# Patient Record
Sex: FEMALE | Race: WHITE
[De-identification: names, ages, dates, MRNs, and addresses within clinical notes are randomized per-mention and may not be internally consistent; named-entity substitution may affect disease eponyms.]

---

## 2020-12-04 ENCOUNTER — HOSPITAL ENCOUNTER (EMERGENCY)
Dept: HOSPITAL 97 - ER | Age: 80
Discharge: HOME | End: 2020-12-04
Payer: COMMERCIAL

## 2020-12-04 DIAGNOSIS — M13.862: Primary | ICD-10-CM

## 2020-12-04 DIAGNOSIS — I10: ICD-10-CM

## 2020-12-04 PROCEDURE — 99283 EMERGENCY DEPT VISIT LOW MDM: CPT

## 2020-12-04 NOTE — EDPHYS
Physician Documentation                                                                           

 [facility]                                                                 

Name: Rosalind Hernandez                                                                            

Age: 80 yrs                                                                                       

Sex: Female                                                                                       

: 1940                                                                                   

MRN: [id number]                                                                                   

Arrival Date: 2020                                                                          

Time: 10:42                                                                                       

Account#: [id number]                                                                            

Bed 13                                                                                            

Private MD:                                                                                       

[facility]ER[facility] Physician Dylon Paige                                                                      

HPI:                                                                                              

                                                                                             

11:28 This 80 yrs old  Female presents to ER via Wheelchair with complaints of Knee  leidy 

      Pain.                                                                                       

11:28 The patient presents with decreased range of motion, pain, that is acute. The           leidy 

      complaints affect the left knee. Context: The problem was sustained at an unknown site,     

      resulted from an unknown cause, the patient can partially bear weight. Onset: The           

      symptoms/episode began/occurred 2 day(s) ago. Modifying factors: The symptoms are           

      alleviated by elevating leg, remaining still, the symptoms are aggravated by movement,      

      bending knee. Associated signs and symptoms: The patient has no apparent associated         

      signs or symptoms. Treatment prior to arrival includes: no previous treatment. The          

      patient has not experienced similar symptoms in the past.                                   

                                                                                                  

Historical:                                                                                       

- Allergies:                                                                                      

11:28 No Known Allergies;                                                                     ll1 

- PMHx:                                                                                           

11:28 Hypertension; Diabetes - IDDM;                                                          ll1 

- PSHx:                                                                                           

11:20 Tonsillectomy; foot surgery;                                                            ll1 

                                                                                                  

- Immunization history:: Flu vaccine is not up to date.                                           

- Social history:: Smoking status: Patient denies any tobacco usage or history of.                

- Family history:: not pertinent.                                                                 

                                                                                                  

                                                                                                  

ROS:                                                                                              

11:28 Constitutional: Negative for fever, chills, and weight loss, Eyes: Negative for injury, leidy 

      pain, redness, and discharge, ENT: Negative for injury, pain, and discharge, Neck:          

      Negative for injury, pain, and swelling, Cardiovascular: Negative for chest pain,           

      palpitations, and edema, Respiratory: Negative for shortness of breath, cough,              

      wheezing, and pleuritic chest pain, Abdomen/GI: Negative for abdominal pain, nausea,        

      vomiting, diarrhea, and constipation, Back: Negative for injury and pain, : Negative      

      for injury, bleeding, discharge, and swelling, Skin: Negative for injury, rash, and         

      discoloration, Neuro: Negative for headache, weakness, numbness, tingling, and seizure,     

      Psych: Negative for depression, anxiety, suicide ideation, homicidal ideation, and          

      hallucinations, Allergy/Immunology: Negative for hives, rash, and allergies, Endocrine:     

      Negative for neck swelling, polydipsia, polyuria, polyphagia, and marked weight             

      changes, Hematologic/Lymphatic: Negative for swollen nodes, abnormal bleeding, and          

      unusual bruising.                                                                           

11:28 MS/extremity: Positive for decreased range of motion, pain, of the left knee.               

                                                                                                  

Exam:                                                                                             

11:28 Constitutional:  This is a well developed, well nourished patient who is awake, alert,  leidy 

      and in no acute distress. Head/Face:  Normocephalic, atraumatic. Eyes:  Pupils equal        

      round and reactive to light, extra-ocular motions intact.  Lids and lashes normal.          

      Conjunctiva and sclera are non-icteric and not injected.  Cornea within normal limits.      

      Periorbital areas with no swelling, redness, or edema. ENT:  Nares patent. No nasal         

      discharge, no septal abnormalities noted.  Tympanic membranes are normal and external       

      auditory canals are clear.  Oropharynx with no redness, swelling, or masses, exudates,      

      or evidence of obstruction, uvula midline.  Mucous membranes moist. Neck:  Trachea          

      midline, no thyromegaly or masses palpated, and no cervical lymphadenopathy.  Supple,       

      full range of motion without nuchal rigidity, or vertebral point tenderness.  No            

      Meningismus. Chest/axilla:  Normal chest wall appearance and motion.  Nontender with no     

      deformity.  No lesions are appreciated. Cardiovascular:  Regular rate and rhythm with a     

      normal S1 and S2.  No gallops, murmurs, or rubs.  Normal PMI, no JVD.  No pulse             

      deficits. Respiratory:  Lungs have equal breath sounds bilaterally, clear to                

      auscultation and percussion.  No rales, rhonchi or wheezes noted.  No increased work of     

      breathing, no retractions or nasal flaring. Abdomen/GI:  Soft, non-tender, with normal      

      bowel sounds.  No distension or tympany.  No guarding or rebound.  No evidence of           

      tenderness throughout. Back:  No spinal tenderness.  No costovertebral tenderness.          

      Full range of motion. Female :  Normal external genitalia. Skin:  Warm, dry with          

      normal turgor.  Normal color with no rashes, no lesions, and no evidence of cellulitis.     

      Neuro:  Awake and alert, GCS 15, oriented to person, place, time, and situation.            

      Cranial nerves II-XII grossly intact.  Motor strength 5/5 in all extremities.  Sensory      

      grossly intact.  Cerebellar exam normal.  Normal gait. Psych:  Awake, alert, with           

      orientation to person, place and time.  Behavior, mood, and affect are within normal        

      limits.                                                                                     

11:28 Musculoskeletal/extremity: Extremities: noted in the left knee: decreased ROM, pain,        

      ROM: limited active range of motion, limited passive range of motion, limited active        

      range of motion due to pain, Circulation is intact in all extremities. Sensation            

      intact. Joints: the  left knee displays limited range of motion, painful range of           

      motion, tenderness, Weight bearing: can bear weight with assistance only, DVT Exam: no      

      swelling, negative Homans' sign noted on exam, no appreciated bluish discoloration, no      

      erythema, no increased warmth, pain, tenderness.                                            

                                                                                                  

Vital Signs:                                                                                      

11:26  / 97; Pulse 77; Resp 17; Temp 98.4; Pulse Ox 96% ; Pain 10/10;                   ll1 

12:29  / 79; Pulse 67; Resp 17; Pulse Ox 97% ;                                          ll1 

                                                                                                  

MDM:                                                                                              

11:07 Patient medically screened.                                                             [facility] 

11:30 Differential diagnosis: closed fracture, contusion, tendonitis. Data reviewed: vital    [facility] 

      signs, nurses notes, radiologic studies, plain films. Data interpreted: Cardiac             

      monitor: rate is 77 beats/min, rhythm is regular, Pulse oximetry: on room air is 96 %.      

      Test interpretation: by ED physician or midlevel provider: plain radiologic studies.        

      Counseling: I had a detailed discussion with the patient and/or guardian regarding: the     

      historical points, exam findings, and any diagnostic results supporting the                 

      discharge/admit diagnosis, lab results, radiology results, the need for outpatient          

      follow up, for definitive care, an internist, a orthopedic surgeon.                         

                                                                                                  

                                                                                             

11:27 Order name: Knee Left 3 View XRAY                                                       [facility] 

                                                                                             

11:27 Order name: Knee Immobilizer; Complete Time: 12:29                                      [facility] 

12                                                                                             

11:27 Order name: Ice pack; Complete Time: 11:41                                              [facility] 

                                                                                                  

Administered Medications:                                                                         

11:41 Drug: Norco 5 mg-325 mg 2 tabs {Note: rass 0.} Route: PO;                               ll1 

12:45 Follow up: Response: No adverse reaction; Pain is decreased; RASS: Alert and Calm (0)   ll1 

11:41 Drug: Motrin 400 mg Route: PO;                                                          ll1 

12:07 Follow up: Response: No adverse reaction; Pain is decreased; RASS: Alert and Calm (0)   ll1 

12:07 Follow up: Response: No adverse reaction; Pain is decreased; RASS: Alert and Calm (0)   ll1 

                                                                                                  

                                                                                                  

Disposition:                                                                                      

20 12:05 Discharged to Home. 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      

- Condition is Stable.                                                                            

- Discharge Instructions: Joint Pain, Arthritis, How to Use a Knee Brace,                         

  Musculoskeletal Pain, Knee Pain, Arthritis, Easy-to-Read, Cryotherapy.                          

- Prescriptions for Tylenol- Codeine #3 300-30 mg Oral Tablet - take 2 tablets by ORAL            

  route every 6 hours As needed; 24 tablet. Motrin  mg Oral Tablet - take 1                 

  tablet by ORAL route every 6 hours As needed as needed with food; 24 tablet.                    

- Medication Reconciliation Form, Thank You Letter, Antibiotic Education, Prescription            

  Opioid Use form.                                                                                

- Follow up: Private Physician; When: 2 - 3 days; Reason: Recheck today's complaints,             

  Continuance of care, Re-evaluation by your physician. Follow up: Luis Whatley;              

  When: 2 - 3 days; Reason: Recheck today's complaints, Re-evaluation by your physician.          

- Problem is new.                                                                                 

- Symptoms have improved.                                                                         

                                                                                                  

                                                                                                  

                                                                                                  

Signatures:                                                                                       

Dispatcher [employer]                           EDDylon Luong MD MD cha Lewis, Lynsay RN                       RN   ll1                                                  

                                                                                                  

Corrections: (The following items were deleted from the chart)                                    

12:47 12:05 2020 12:05 Discharged to Home. Impression: Pain in left knee -              ll1 

      tricompartment arthritis. Condition is Stable. Discharge Instructions: Joint Pain,          

      Arthritis, How to Use a Knee Brace, Musculoskeletal Pain, Knee Pain, Arthritis,             

      Easy-to-Read, Cryotherapy. Prescriptions for Tylenol-Codeine #3 300-30 mg Oral Tablet -     

      take 2 tablets by ORAL route every 6 hours As needed; 24 tablet, Motrin  mg Oral      

      Tablet - take 1 tablet by ORAL route every 6 hours As needed as needed with food; 24        

      tablet. and Forms are Medication Reconciliation Form, Thank You Letter, Antibiotic          

      Education, Prescription Opioid Use. Follow up: Private Physician; When: 2 - 3 days;         

      Reason: Recheck today's complaints, Continuance of care, Re-evaluation by your              

      physician. Follow up: Luis Whatley; When: 2 - 3 days; Reason: Recheck today's           

      complaints, Re-evaluation by your physician. Problem is new. Symptoms have improved. leidy    

                                                                                                  

**************************************************************************************************

## 2020-12-04 NOTE — ER
Nurse's Notes                                                                                     

 [facility]                                                                 

Name: Rosalind Hernandez                                                                            

Age: 80 yrs                                                                                       

Sex: Female                                                                                       

: 1940                                                                                   

MRN: [id number]                                                                                   

Arrival Date: 2020                                                                          

Time: 10:42                                                                                       

Account#: [id number]                                                                            

Bed 13                                                                                            

Private MD:                                                                                       

Diagnosis: Pain in left knee-tricompartment arthritis                                             

                                                                                                  

Presentation:                                                                                     

                                                                                             

11:26 Chief complaint: Patient states: L knee pain for 1 week. No trauma or falls. No fever.  ll1 

      Coronavirus screen: Client denies travel out of the [place] in the last 14 days. At this       

      time, the client does not indicate any symptoms associated with coronavirus-19. Ebola       

      Screen: Patient denies travel to an Ebola-affected area in the 21 days before illness       

      onset. Initial Sepsis Screen: Does the patient meet any 2 criteria? No. Patient's           

      initial sepsis screen is negative. Does the patient have a suspected source of              

      infection? Yes: Bone or joint infection. Risk Assessment: Do you want to hurt yourself      

      or someone else? Patient reports no desire to harm self or others. Onset of symptoms        

      was 2020.                                                                      

11:26 Method Of Arrival: Wheelchair                                                           ll1 

11:26 Acuity: CESIA 4                                                                           ll1 

                                                                                                  

Historical:                                                                                       

- Allergies:                                                                                      

11:28 No Known Allergies;                                                                     ll1 

- PMHx:                                                                                           

11:28 Hypertension; Diabetes - IDDM;                                                          ll1 

- PSHx:                                                                                           

11:20 Tonsillectomy; foot surgery;                                                            ll1 

                                                                                                  

- Immunization history:: Flu vaccine is not up to date.                                           

- Social history:: Smoking status: Patient denies any tobacco usage or history of.                

- Family history:: not pertinent.                                                                 

                                                                                                  

                                                                                                  

Screenin:20 Abuse screen: Denies threats or abuse. Nutritional screening: No deficits noted.        ll1 

      Tuberculosis screening: No symptoms or risk factors identified. Fall Risk Ambulatory        

      Aid- Crutches/Cane/Walker (15 pts). Gait- Impaired (20 pts.). Total Luna Fall Scale        

      indicates Low Risk Score (25-44 pts). Fall prevention measures have been instituted.        

      Side Rails Up X 2 Frequent Obs/Assesments occuring As available Patient and Family          

      Educated on Fall Prevention Program and strategies.                                         

                                                                                                  

Assessment:                                                                                       

11:26 General: Appears in no apparent distress. Behavior is calm, cooperative, appropriate    ll1 

      for age. Pain: Complains of pain in left knee Quality of pain is described as aching,       

      Aggravated by increased activity. Neuro: No deficits noted. Cardiovascular: No deficits     

      noted. Respiratory: No deficits noted. GI: No deficits noted. Musculoskeletal:              

      Circulation, motion, and sensation intact. Capillary refill < 3 seconds, Range of           

      motion: intact in all extremities, Tenderness present in left knee Reports pain in left     

      knee.                                                                                       

12:30 Reassessment: Patient and/or family updated on plan of care and expected duration. Pain ll1 

      level reassessed. Patient is alert, oriented x 3, equal unlabored respirations, skin        

      warm/dry/pink.                                                                              

                                                                                                  

Vital Signs:                                                                                      

11:26  / 97; Pulse 77; Resp 17; Temp 98.4; Pulse Ox 96% ; Pain 10/10;                   ll1 

12:29  / 79; Pulse 67; Resp 17; Pulse Ox 97% ;                                          ll1 

                                                                                                  

ED Course:                                                                                        

10:42 Patient arrived in ED.                                                                  as  

11:07 Dylon Paige MD is Attending Physician.                                             [facility] 

11:19 Moreno Fernandez RN is Primary Nurse.                                                     ll1 

11:19 Arm band placed on Patient placed in an exam room, on a stretcher.                      ll1 

11:27 Triage completed.                                                                       ll1 

12:05 Luis Whatley MD is Referral Physician.                                            [facility] 

12:05 Knee Left 3 View XRAY In Process Unspecified.                                           EDMS

12:30 Patient has correct armband on for positive identification. Bed in low position. Call   ll1 

      light in reach. Side rails up X 1. Cardiac monitoring not applicable on this patient.       

12:47 No provider procedures requiring assistance completed. Patient did not have IV access   ll1 

      during this emergency room visit.                                                           

                                                                                                  

Administered Medications:                                                                         

11:41 Drug: Norco 5 mg-325 mg 2 tabs {Note: rass 0.} Route: PO;                               ll1 

12:45 Follow up: Response: No adverse reaction; Pain is decreased; RASS: Alert and Calm (0)   ll1 

11:41 Drug: Motrin 400 mg Route: PO;                                                          ll1 

12:07 Follow up: Response: No adverse reaction; Pain is decreased; RASS: Alert and Calm (0)   ll1 

12:07 Follow up: Response: No adverse reaction; Pain is decreased; RASS: Alert and Calm (0)   ll1 

                                                                                                  

                                                                                                  

Outcome:                                                                                          

12:05 Discharge ordered by MD.                                                                leidy 

12:47 Patient left the ED.                                                                    ll1 

12:47 Discharged to home via wheelchair.                                                      ll1 

12:47 Condition: stable                                                                           

12:47 Discharge instructions given to patient, family, Instructed on discharge instructions,      

      follow up and referral plans. medication usage, Demonstrated understanding of               

      instructions, follow-up care, medications, Prescriptions given X 2.                         

                                                                                                  

Signatures:                                                                                       

Dispatcher [employer]                           Dylon Regan MD MD cha Martinez, Amelia as Lewis, Lynsay RN                       RN   ll1                                                  

                                                                                                  

**************************************************************************************************

## 2020-12-04 NOTE — RAD REPORT
EXAM DESCRIPTION:  RAD - Knee Left 3 View - 12/4/2020 12:05 pm

 

CLINICAL HISTORY:  PAIN, nontraumatic

 

COMPARISON:  No comparisons

 

FINDINGS:  No fracture, dislocation or periosteal reaction.No joint effusion seen. No joint space roxy
rowing. Minimal marginal spurring medial compartment and along the intercondylar notch. Spurring is s
een at the quadriceps attachment to the patella and the patella insertion into the tibia.

 

No suspicious soft tissue finding.

 

IMPRESSION:  Mild degenerative change to the knee joint as detailed. No acute bone or joint finding.

 

Clinical concerns for internal derangement or occult bony injury could be further assessed with MR im
aging.

## 2020-12-09 VITALS — TEMPERATURE: 98.4 F

## 2020-12-09 VITALS — OXYGEN SATURATION: 97 % | DIASTOLIC BLOOD PRESSURE: 79 MMHG | SYSTOLIC BLOOD PRESSURE: 160 MMHG

## 2023-05-22 ENCOUNTER — HOSPITAL ENCOUNTER (EMERGENCY)
Dept: HOSPITAL 97 - ER | Age: 83
Discharge: HOME | End: 2023-05-22
Payer: COMMERCIAL

## 2023-05-22 VITALS — OXYGEN SATURATION: 97 % | DIASTOLIC BLOOD PRESSURE: 72 MMHG | SYSTOLIC BLOOD PRESSURE: 155 MMHG

## 2023-05-22 VITALS — TEMPERATURE: 98 F

## 2023-05-22 DIAGNOSIS — E11.9: ICD-10-CM

## 2023-05-22 DIAGNOSIS — I10: ICD-10-CM

## 2023-05-22 DIAGNOSIS — M54.50: Primary | ICD-10-CM

## 2023-05-22 PROCEDURE — 76377 3D RENDER W/INTRP POSTPROCES: CPT

## 2023-05-22 PROCEDURE — 74176 CT ABD & PELVIS W/O CONTRAST: CPT

## 2023-05-22 NOTE — ER
Nurse's Notes                                                                                     

 [facility]                                                                 

Name: Rosalind Hernandez                                                                            

Age: 82 yrs                                                                                       

Sex: Female                                                                                       

: 1940                                                                                   

MRN: [id number]                                                                                   

Arrival Date: 2023                                                                          

Time: 16:32                                                                                       

Account#: [id number]                                                                            

Bed 18                                                                                            

Private MD: Ting Reza                                                                        

Diagnosis: Low back pain                                                                          

                                                                                                  

Presentation:                                                                                     

                                                                                             

16:49 Chief complaint: Patient states: R low back pain, was seen by PCP and had urinalysis w/ ph  

      culture "that was fine" but pt continues to have pain, denies fever, difficulty             

      urinating, N/V/D. Coronavirus screen: Vaccine status: Patient reports being                 

      unvaccinated. Ebola Screen: No symptoms or risks identified at this time. Initial           

      Sepsis Screen: Does the patient meet any 2 criteria? No. Patient's initial sepsis           

      screen is negative. Does the patient have a suspected source of infection? No.              

      Patient's initial sepsis screen is negative. Risk Assessment: Do you want to hurt           

      yourself or someone else? Patient reports no desire to harm self or others. Onset of        

      symptoms was May 22, 2023.                                                                  

16:49 Method Of Arrival: Ambulatory                                                           ph  

16:49 Acuity: CESIA 3                                                                           ph  

                                                                                                  

Historical:                                                                                       

- Allergies:                                                                                      

16:55 No Known Allergies;                                                                     ph  

- PMHx:                                                                                           

16:55 Diabetes - IDDM; Hypertension;                                                          ph  

                                                                                                  

- Immunization history:: Adult Immunizations unknown.                                             

- Social history:: Smoking status: Patient denies any tobacco usage or history of.                

                                                                                                  

                                                                                                  

Screenin:07 [facility] ED Fall Risk Assessment (Adult) History of falling in the last 3 months,       kc6 

      including since admission No falls in past 3 months (0 pts) Confusion or Disorientation     

      No (0 pts) Intoxicated or Sedated No (0 pts) Impaired Gait No (0 pts) Mobility Assist       

      Device Used No (0 pt) Altered Elimination No (0 pt) Score/Fall Risk Level 0 - 2 = Low       

      Risk Oriented to surroundings, Maintained a safe environment, Educated pt \T\ family on     

      fall prevention, incl call for assistance when getting out of bed, Assessed \T\             

      reinforced patient's understanding of fall precautions, Hourly rounding (assess needs \T\   

      fall precautionary measures) done. Abuse screen: Denies threats or abuse. Denies            

      injuries from another. Nutritional screening: No deficits noted. Tuberculosis               

      screening: No symptoms or risk factors identified.                                          

                                                                                                  

Assessment:                                                                                       

17:00 General: Appears in no apparent distress. comfortable, Behavior is calm, cooperative,   kc6 

      appropriate for age. Pain: Complains of pain in right flank. Neuro: [place]                

      Agitation-Sedation Scale (RASS): 0 - Alert and Calm Level of Consciousness is awake,        

      alert, obeys commands, Oriented to person, place, time, situation, Appropriate for age.     

      Cardiovascular: Capillary refill < 3 seconds. Respiratory: Airway is patent Trachea         

      midline Respiratory effort is even, unlabored, Respiratory pattern is regular,              

      symmetrical. GI: No signs and/or symptoms were reported involving the gastrointestinal      

      system. : Urine is blood tinged. EENT: No signs and/or symptoms were reported             

      regarding the EENT system. Derm: No signs and/or symptoms reported regarding the            

      dermatologic system. Skin is intact, Skin is pink, warm \T\ dry. Musculoskeletal: No        

      signs and/or symptoms reported regarding the musculoskeletal system. Circulation,           

      motion, and sensation intact. Capillary refill < 3 seconds, Range of motion: intact in      

      all extremities.                                                                            

17:50 Reassessment: Patient appears in no apparent distress at this time. No changes from     kc6 

      previously documented assessment. Patient and/or family updated on plan of care and         

      expected duration. Pain level reassessed. Patient is alert, oriented x 3, equal             

      unlabored respirations, skin warm/dry/pink.                                                 

                                                                                                  

Vital Signs:                                                                                      

16:49 Pulse 76; Resp 18; Temp 98; Pulse Ox 95% ; Weight 91.63 kg; Height 5 ft. 3 in. ;        ph  

16:49  / 97;                                                                            ph  

17:54  / 80; Pulse 79; Resp 16; Pulse Ox 98% on R/A;                                    kc6 

18:28  / 72; Pulse 65; Resp 19 S; Pulse Ox 97% on R/A;                                  kc6 

16:49 Body Mass Index 35.78 (91.63 kg, 160.02 cm)                                             ph  

                                                                                                  

ED Course:                                                                                        

16:33 Patient arrived in ED.                                                                  am2 

16:33 Ting Reza is Private Physician.                                                    am2 

16:37 Isaiah Handley PA is PHCP.                                                              jmm 

16:37 Tessie Webster MD is Attending Physician.                                                jmm 

16:55 Triage completed.                                                                       ph  

16:56 Arm band placed on.                                                                     ph  

17:03 Mary Anne Morrison, RN is Primary Nurse.                                                 kc6 

17:07 Patient has correct armband on for positive identification. Bed in low position. Call   kc6 

      light in reach. Side rails up X2. Adult w/ patient.                                         

17:42 CT Stone Protocol In Process Unspecified.                                               EDMS

18:36 No provider procedures requiring assistance completed. Patient did not have IV access   kc6 

      during this emergency room visit.                                                           

                                                                                                  

Administered Medications:                                                                         

No medications were administered                                                                  

                                                                                                  

                                                                                                  

Medication:                                                                                       

18:36 VIS not applicable for this client.                                                     kc6 

                                                                                                  

Outcome:                                                                                          

18:14 Discharge ordered by MD.                                                                beny 

18:36 Discharged to home ambulatory, with family, with significant other.                     kc6 

18:36 Condition: stable                                                                           

18:36 Discharge instructions given to patient, Instructed on discharge instructions, follow       

      up and referral plans. medication usage, Demonstrated understanding of instructions,        

      follow-up care, medications, Prescriptions given X 1.                                       

18:36 Patient left the ED.                                                                    kc6 

                                                                                                  

Signatures:                                                                                       

Dispatcher [employer]                           EDMS                                                 

Isaiah Handley PA PA jmm Hall, Patricia, RN                      RN   Zaina Gardner Kaitlyn, RN                   RN   kc6                                                  

                                                                                                  

**************************************************************************************************

## 2023-05-22 NOTE — RAD REPORT
EXAM DESCRIPTION:  CT - Stone Protocol - 5/22/2023 5:40 pm

 

CLINICAL HISTORY:  Flank pain.

right flank pain

 

COMPARISON:  <Comparisons>

 

TECHNIQUE:  Axial images were obtained without oral or IV contrast. Lack of contrast limits solid org
an and vascular assessment. The field-of-view spans the entirety of the  system partially obscuring
 uppermost abdomen and lung bases. Coronal reformatted images were obtained and reviewed.

 

All CT scans are performed using dose optimization technique as appropriate and may include automated
 exposure control or mA/KV adjustment according to patient size.

 

FINDINGS:  The lower lung fields are clear.

 

Imaged portions of the liver and spleen show no suspicious findings on non-contrast imaging. The panc
reas and adrenal glands are normal. No pathologic lymphadenopathy in the abdomen or pelvis.

 

No urinary tract stones or obstructive uropathy.

 

No bowel obstruction, free air, free fluid or abscess. Normal appendix noted.Mild sigmoid diverticulo
sis coli without diverticulitis.

 

Moderate lumbar degenerative changes.

 

IMPRESSION:  No urinary tract stones or obstructive uropathy.

 

Sigmoid diverticulosis coli without diverticulitis.

## 2023-05-22 NOTE — EDPHYS
Physician Documentation                                                                           

 [facility]                                                                 

Name: Rosalind Hernandez                                                                            

Age: 82 yrs                                                                                       

Sex: Female                                                                                       

: 1940                                                                                   

MRN: [id number]                                                                                   

Arrival Date: 2023                                                                          

Time: 16:32                                                                                       

Account#: [id number]                                                                            

Bed 18                                                                                            

Private MD: Ting Reza ED Physician Tessie Webster                                                                         

Historical:                                                                                       

- Allergies:                                                                                      

                                                                                             

16:55 No Known Allergies;                                                                     ph  

- PMHx:                                                                                           

16:55 Diabetes - IDDM; Hypertension;                                                          ph  

                                                                                                  

- Immunization history:: Adult Immunizations unknown.                                             

- Social history:: Smoking status: Patient denies any tobacco usage or history of.                

                                                                                                  

                                                                                                  

Vital Signs:                                                                                      

16:49 Pulse 76; Resp 18; Temp 98; Pulse Ox 95% ; Weight 91.63 kg; Height 5 ft. 3 in. ;        ph  

16:49  / 97;                                                                            ph  

17:54  / 80; Pulse 79; Resp 16; Pulse Ox 98% on R/A;                                    kc6 

18:28  / 72; Pulse 65; Resp 19 S; Pulse Ox 97% on R/A;                                  kc6 

16:49 Body Mass Index 35.78 (91.63 kg, 160.02 cm)                                             ph  

                                                                                                  

MDM:                                                                                              

16:54 Patient medically screened.                                                             [facility] 

                                                                                                  

                                                                                             

16:54 Order name: [facility] Stone Protocol; Complete Time: 17:50                                     [facility] 

                                                                                                  

Administered Medications:                                                                         

No medications were administered                                                                  

                                                                                                  

                                                                                                  

Disposition Summary:                                                                              

23 18:14                                                                                    

Discharge Ordered                                                                                 

      Location: Home                                                                          [facility] 

      Condition: Stable                                                                       [facility] 

      Diagnosis                                                                                   

        - Low back pain                                                                       [facility] 

      Followup:                                                                               [facility] 

        - With: Private Physician                                                                  

        - When: 2 - 3 days                                                                         

        - Reason: Recheck today's complaints, Continuance of care, Re-evaluation by your           

      physician                                                                                   

      Discharge Instructions:                                                                     

        - Discharge Summary Sheet                                                             [facility] 

        - Acute Back Pain, Adult                                                              [facility] 

      Forms:                                                                                      

        - Medication Reconciliation Form                                                      [facility] 

        - Thank You Letter                                                                    [facility] 

        - Antibiotic Education                                                                [facility] 

        - Prescription Opioid Use                                                             [facility] 

      Prescriptions:                                                                              

        - orphenadrine citrate 100 mg Oral Tablet Sustained Release                                

            - take 1 tablet by ORAL route 2 times per day As needed; 20 tablet; Refills: 0,   [facility] 

      Product Selection Permitted                                                                 

Signatures:                                                                                       

Dispatcher [employer]                           EDIsaiah Jacobsen PA PA jmm Hall, Patricia RN                      RN   ph                                                   

                                                                                                  

**************************************************************************************************

## 2023-05-22 NOTE — XMS REPORT
Continuity of Care Document

                             Created on:May 22, 2023



Patient:DAVE DUMONT

Sex:Female

:1940

External Reference #:[id number]





Demographics







                          Address                   [address] [place] [address]

 

                          Home Phone                [phone]

 

                          Mobile Phone              [phone]

 

                          Email Address             [email or web address]

 

                          Preferred Language        English

 

                          Marital Status            Unknown

 

                          Yarsani Affiliation     Unknown

 

                          Race                      Unknown

 

                          Additional Race(s)        Unavailable



                                                    Unavailable



                                                    White

 

                          Ethnic Group              Unknown









Author







                          Organization              [facility]

t

 

                          Address                   [address] [place] [address]

 

                          Phone                     [phone]









Support







                Name            Relationship    Address         Phone

 

                Favorite, Ethan  Child           Unavailable     [phone]

 

                1               ETHAN            Unavailable     Unavailable

 

                2               Unavailable     Unavailable     Unavailable









Care Team Providers







                    Name                Role                Phone

 

                    Juaquin THOMAS, Ting Jones Primary Care Physician [phone]

 

                    ONIEL PARIKH        Attending Clinician Unavailable

 

                    LAB90               Attending Clinician Unavailable

 

                    ANDERS FOSTER      Attending Clinician Unavailable

 

                    TING HICKEY Attending Clinician Unavailable

 

                    MD MAC  Attending Clinician Unavailable

 

                    Fozia THOMAS, Dwayne Carvalho Attending Clinician [phone]

 

                    Larry RN, Elyssa Buchanan Attending Clinician Unavailable

 

                    Ting Hickey MD Attending Clinician [phone]

[phone]

 

                    WATERS_S            Attending Clinician Unavailable

 

                    HUANG_S            Admitting Clinician Unavailable









Payers







           Payer Name Policy Type Policy Number Effective Date Expiration Date S

ource

 

           [employer] [place] 5          [id number] 2022            



                                            00:00:00              

 

           [employer] (MEDICARE            [id number] 2022            



           REPLACEMENT PPO)                       00:00:00              







Problems







       Condition Condition Condition Status Onset  Resolution Last   Treating Co

mments 

Source



       Name   Details Category        Date   Date   Treatment Clinician        



                                                 Date                 

 

       Immunodefi Immunodefi Disease Active 2022                             DAHIANA barclay



       ciency due ciency due                                              Se

ybold



       to     to                   00:00:                             -



       conditions conditions               00                                 Ex

terna



       classified classified                                                  l



       elsewhere elsewhere                                                  

 

       Uncontroll Uncontroll Disease Active                              DAHIANA barclay



       ed type 2 ed type 2               1-17                               Sey[facility]

old



       diabetes diabetes               00:00:                             -



       mellitus mellitus               00                                 Extern

a



       with   with                                                    l



       hyperglyce hyperglyce                                                  



       salomon    salomon                                                     

 

       Primary Primary Disease Active                              Minoo



       hypertensi hypertensi               1-17                               Se

ybold



       on     on                   00:00:                             -



                                   00                                 Externa



                                                                      l

 

       No known No known Disease                                           Kelse

y



       active active                                                  [facility]



       problems problems                                                  







Allergies, Adverse Reactions, Alerts







       Allergy Allergy Status Severity Reaction(s) Onset  Inactive Treating Comm

ents 

Source



       Name   Type                        Date   Date   Clinician        

 

       Sulfa  Propensi Active        Nausea Only                       Matthew

sey



       Drugs  ty to                       8                        [facility]



              adverse                      00:00:                      -



              reaction                      00                          Externa



              s                                                       l

 

       Amoxicil Propensi Active        Other                        Minoo



       jim    ty to                       8                        [facility]



              adverse                      00:00:                      -



              reaction                      00                          Externa



              s                                                       l

 

       Cephalex Propensi Active        Nausea Only                       K

elsey



       in     ty to                       8                        [facility]



              adverse                      00:00:                      -



              reaction                      00                          Externa



              s                                                       l

 

       Penicill Propensi Active        Other                        Minoo



       in V   ty to                       8                        S[facility]



       Potassiu adverse                      00:00:                      



       m      reaction                      00                          



              s                                                       

 

       Sulfa  Propensi Active        Nausea Only                       Matthew

sey



       Drugs  ty to                                               [facility]



              adverse                      00:00:                      



              reaction                      00                          



              s                                                       

 

       Lisinopr Propensi Active        Other                        Minoo



       il     ty to                       830                        [facility]



              adverse                      00:00:                      -



              reaction                      00                          Externa



              s                                                       l

 

       Lisinopr Propensi Active        Other                        Minoo



       il     ty to                       830                        [facility]



              adverse                      00:00:                      



              reaction                      00                          



              s                                                       







Family History







           Family Member Diagnosis  Comments   Start Date Stop Date  Source

 

           Natural father Heart failure                                  [facility]

 

           Natural mother                                             [facility]







Social History







           Social Habit Start Date Stop Date  Quantity   Comments   Source

 

           Gender identity                                             [facility]



                                                                  [facility]

 

           Sexual orientation                                             [facility]

ist



                                                                  [facility]ospital

 

           History of Social 2023                       [facility]



           function   00:00:00   00:00:00                         Hospital

 

           Tobacco use and 2022 Smokeless             [facility]



           exposure   00:00:00   00:00:00   tobacco non-user            Hospital

 

           Sex Assigned At 1940                       [facility]



           Birth      00:00:00   00:00:00                         Hospital









                Smoking Status  Start Date      Stop Date       Source

 

                Never smoked tobacco                                 [facility]

old - External







Medications







       Ordered Filled Start  Stop   Current Ordering Indication Dosage Frequency

 Signature

                    Comments            Components          Source



     Medication Medication Date Date Medication? Clinician                (SIG) 

          



     Name Name                                                   

 

     ASPIRIN 81            Yes            1{tbl}      Take 1           Matthew

sey



     OR        5-16                               tablet by           Sey[facility]



               11:23:                               mouth           -



               09                                 daily           Externa



                                                                 l

 

     Cranberry      -0      Yes                      Take by           Kelse

y



     400 MG oral      5-16                               mouth           [facility]



     Capsule      11:23:                                              -



               09                                                Externa



                                                                 l

 

     vit       -0      Yes                      Take by           [facility]



     C/E/zinc      4-05                               mouth.           st



     ox/francis/lut      14:48:                                              Hospit

a



     /zeax      09                                                l



     (ICAPS                                                        



     AREDS2                                                        



     ORAL)                                                        

 

     cholecalcif      0      Yes            125ug QD   Take 125           M

ethodi



     shadi,      4-05                               mcg by           st



     vitamin D3,      14:48:                               mouth           Hos[facility]

ta



     (VITAMIN D3      09                                 daily.           l



     ORAL)                                                        

 

     aspirin            Yes            81mg      Take 1           [facility]



     (ECOTRIN)      4-05                               tablet (81           st



     81 MG      14:46:                               mg total)           Hospita



     enteric      48                                 by mouth.           l



     coated                                                        



     tablet                                                        

 

     cranberry            Yes                      Take by           Metho

di



     400 mg      4-05                               mouth.           st



     capsule      14:46:                                              Hospita



               48                                                l

 

     rosuvastati      0      Yes            5mg  QD   Take 1           Meth

tyrese



     n (CRESTOR)      4-05                               tablet (5           st



     5 mg tablet      00:00:                               mg total)           H

ospita



               00                                 by mouth           l



                                                  daily.           

 

     Cranberry            Yes                      Take by           Kelse

y



     400 MG oral      2-15                               mouth           [facility]



     Capsule      07:25:                                              -



               37                                                Externa



                                                                 l

 

     Irbesartan-      -0      Yes       [id number] 1{tbl}      Take 1         

  Minoo



     hydroCHLORO      1-03                               tablet by           Sey

bold



     thiazide      00:00:                               mouth           -



     150-12.5 MG      00                                 daily           Externa



     oral Tablet                                                        l

 

     Irbesartan-      -0      Yes       [id number] 1{tbl}      Take 1         

  [facility]ino[facility]



     hydroCHLORO      1-03                               tablet by           Sey

bold



     thiazide      00:00:                               mouth           -



     150-12.5 MG      00                                 daily           Externa



     oral Tablet                                                        l

 

     aspirin      2022      Yes            81mg      Take 1           [facility]



     (ECOTRIN)      2-28                               tablet (81           st



     81 MG      13:04:                               mg total)           [facility]osp[facility]



     enteric      17                                 by mouth.           l



     coated                                                        



     tablet                                                        

 

     cranberry      2022      Yes                      Take by           Metho

di



     400 mg      2-28                               mouth.           st



     capsule      13:04:                                              [facility]osp[facility]



               17                                                l

 

     guaiFENesin      2022      Yes            200mg Q.[id number] Take 10 mL    

       [facility]



     (Tussin)      2-28                          [id number] (200 mg           st



     100 mg/5 mL      13:04:                          3D   total) by           H

ospita



     syrup      17                                 mouth 3           l



                                                  (three)           



                                                  times a           



                                                  day as           



                                                  needed for           



                                                  cough.           

 

     aspirin      2022      Yes            81mg      Take 1           [facility]



     (ECOTRIN)      2-28                               tablet (81           st



     81 MG      13:04:                               mg total)           [facility]osp[facility]



     enteric      17                                 by mouth.           l



     coated                                                        



     tablet                                                        

 

     cranberry      2022      Yes                      Take by           Metho

di



     400 mg      2-28                               mouth.           st



     capsule      13:04:                                              [facility]osp[facility]



               17                                                l

 

     guaiFENesin      2022      Yes            200mg Q.[id number] Take 10 mL    

       [facility]



     (Tussin)      2-28                          [id number] (200 mg           st



     100 mg/5 mL      13:04:                          3D   total) by           H

ospita



     syrup      17                                 mouth 3           l



                                                  (three)           



                                                  times a           



                                                  day as           



                                                  needed for           



                                                  cough.           

 

     aspirin      2022      Yes            81mg      Take 1           [facility]



     (ECOTRIN)      2-28                               tablet (81           st



     81 MG      13:04:                               mg total)           [facility]osp[facility]



     enteric      17                                 by mouth.           l



     coated                                                        



     tablet                                                        

 

     cranberry      2022      Yes                      Take by           Metho

di



     400 mg      2-28                               mouth.           st



     capsule      13:04:                                              [facility]ospita



               17                                                l

 

     guaiFENesin      2022      Yes            200mg Q.[id number] Take 10 mL    

       [facility]



     (Tussin)      2-28                          [id number] (200 mg           st



     100 mg/5 mL      13:04:                          3D   total) by           H

ospita



     syrup      17                                 mouth 3           l



                                                  (three)           



                                                  times a           



                                                  day as           



                                                  needed for           



                                                  cough.           

 

     aspirin      2022      Yes            81mg      Take 1           [facility]



     (ECOTRIN)      2-28                               tablet (81           st



     81 MG      13:04:                               mg total)           [facility]osp[facility]



     enteric      17                                 by mouth.           l



     coated                                                        



     tablet                                                        

 

     cranberry      2022      Yes                      Take by           Metho

di



     400 mg      2-28                               mouth.           st



     capsule      13:04:                                              [facility]ospita



               17                                                l

 

     guaiFENesin      2022      Yes            200mg Q.[id number] Take 10 mL    

       [facility]



     (Tussin)      2-28                          [id number] (200 mg           st



     100 mg/5 mL      13:04:                          3D   total) by           H

ospita



     syrup      17                                 mouth 3           l



                                                  (three)           



                                                  times a           



                                                  day as           



                                                  needed for           



                                                  cough.           

 

     guaiFENesin      2022      Yes            200mg Q.[id number] Take 10 mL    

       [facility]



     (Tussin)      -                          [id number] (200 mg           st



     100 mg/5 mL      13:04:                          3D   total) by           H

ospita



     syrup      17                                 mouth 3           l



                                                  (three)           



                                                  times a           



                                                  day as           



                                                  needed for           



                                                  cough.           

 

     aspirin      2022      Yes            81mg      Take 1           [facility]



     (ECOTRIN)      2-28                               tablet (81           st



     81 MG      13:04:                               mg total)           [facility]ospita



     enteric      17                                 by mouth.           l



     coated                                                        



     tablet                                                        

 

     cranberry      2022      Yes                      Take by           Meth[facility]

di



     400 mg      2-28                               mouth.           st



     capsule      13:04:                                              Hospita



               17                                                l

 

     guaiFENesin      2022      Yes            200mg Q.[id number] Take 10 mL    

       [facility]



     (Tussin)      2-                          [id number] (200 mg           st



     100 mg/5 mL      13:04:                          3D   total) by           H

ospita



     syrup      17                                 mouth 3           l



                                                  (three)           



                                                  times a           



                                                  day as           



                                                  needed for           



                                                  cough.           

 

     Doxycycline      2022 No                       TAKE ONE           K

elsey



     Hyclate 100      -                          CAPSULE           Seyb

old



     MG oral      00:00: 00:00                          DAILY WITH           -



     Capsule      00   :00                           FOOD. WAIT           Extern

a



                                                  2 HOURS           l



                                                  BEFORE           



                                                  LYING DOWN           

 

     Metformin      2022      Yes       [id number] 500mg      Take 1           

Minoo



     HCl 500 MG      1-30                               tablet           [facility]



     oral Tablet      00:00:                               (500 mg           -



               00                                 total) by           Externa



                                                  mouth in           l



                                                  the            



                                                  morning           



                                                  and 1           



                                                  tablet           



                                                  (500 mg           



                                                  total) in           



                                                  the            



                                                  evening.           



                                                  Take with           



                                                  meals.           

 

     Metformin      2022      Yes       [id number] 500mg      Take 1           

Minoo



     HCl 500 MG      1-30                               tablet           [facility]



     oral Tablet      00:00:                               (500 mg           -



               00                                 total) by           Externa



                                                  mouth in           l



                                                  the            



                                                  morning           



                                                  and 1           



                                                  tablet           



                                                  (500 mg           



                                                  total) in           



                                                  the            



                                                  evening.           



                                                  Take with           



                                                  meals.           

 

     metFORMIN      2022      Yes                      TAKE 1           [facility]

[facility]



     (GLUCOPHAGE      1-30                               TABLET           st



     ) 500 mg      00:00:                               (500 MG           [facility]

a



     tablet      00                                 TOTAL) BY           l



                                                  MOUTH IN           



                                                  THE            



                                                  MORNING           



                                                  AND 1           



                                                  TABLET IN           



                                                  THE            



                                                  EVENING           



                                                  WITH MEALS           

 

     metFORMIN      2022      Yes                      TAKE 1           [facility]

[facility]



     (GLUCOPHAGE      1-30                               TABLET           st



     ) 500 mg      00:00:                               (500 MG           [facility]

a



     tablet      00                                 TOTAL) BY           l



                                                  MOUTH IN           



                                                  THE            



                                                  MORNING           



                                                  AND 1           



                                                  TABLET IN           



                                                  THE            



                                                  EVENING           



                                                  WITH MEALS           

 

     metFORMIN      2022      Yes                      TAKE 1           [facility]

[facility]



     (GLUCOPHAGE      1-30                               TABLET           st



     ) 500 mg      00:00:                               (500 MG           [facility]

a



     tablet      00                                 TOTAL) BY           l



                                                  MOUTH IN           



                                                  THE            



                                                  MORNING           



                                                  AND 1           



                                                  TABLET IN           



                                                  THE            



                                                  EVENING           



                                                  WITH MEALS           

 

     metFORMIN      2022      Yes                      TAKE 1           [facility]

[facility]



     (GLUCOPHAGE      1-30                               TABLET           st



     ) 500 mg      00:00:                               (500 MG           [facility]

a



     tablet      00                                 TOTAL) BY           l



                                                  MOUTH IN           



                                                  THE            



                                                  MORNING           



                                                  AND 1           



                                                  TABLET IN           



                                                  THE            



                                                  EVENING           



                                                  WITH MEALS           

 

     metFORMIN      2022      Yes                      TAKE 1           [facility]

[facility]



     (GLUCOPHAGE      1-30                               TABLET           st



     ) 500 mg      00:00:                               (500 MG           [facility]

a



     tablet      00                                 TOTAL) BY           l



                                                  MOUTH IN           



                                                  THE            



                                                  MORNING           



                                                  AND 1           



                                                  TABLET IN           



                                                  THE            



                                                  EVENING           



                                                  WITH MEALS           

 

     metFORMIN      2022      Yes                      TAKE 1           [facility]

[facility]



     (GLUCOPHAGE      1-30                               TABLET           st



     ) 500 mg      00:00:                               (500 MG           [facility]

a



     tablet      00                                 TOTAL) BY           l



                                                  MOUTH IN           



                                                  THE            



                                                  MORNING           



                                                  AND 1           



                                                  TABLET IN           



                                                  THE            



                                                  EVENING           



                                                  WITH MEALS           

 

     ASPIRIN 81      2022      Yes            1{tbl}      Take 1           [facility]

[facility]



     OR        1-29                               tablet by           [facility]



               08:11:                               mouth           -



               49                                 daily           [facility]



                                                                 l

 

     ASPIRIN 81      2022      Yes            1{tbl}      Take 1           [facility]

[facility]



     OR        1-29                               tablet by           [facility]



               08:11:                               mouth           -



               49                                 daily           [facility]



                                                                 l

 

     Tresiba      2022      Yes                      INJECT 68           [facility]

[facility]



     FlexTouch      1-26                               UNITS           st



     U-100 100      00:00:                               SUBCUTANEO           [facility]

[facility]



     unit/mL (3      00                                 USLY DAILY           l



     mL)                                          FOR            



     subcutaneou                                         DIABETES           



     s pen                                                        

 

     Tresiba      2022      Yes                      INJECT 68           [facility]

[facility]



     FlexTouch      1-26                               UNITS           st



     U-100 100      00:00:                               SUBCUTANEO           [facility]

[facility]



     unit/mL (3      00                                 USLY DAILY           l



     mL)                                          FOR            



     subcutaneou                                         DIABETES           



     s pen                                                        

 

     Tresiba      2022      Yes                      INJECT 68           [facility]

[facility]



     FlexTouch      1-26                               UNITS           st



     U-100 100      00:00:                               SUBCUTANEO           [facility]

spita



     unit/mL (3      00                                 USLY DAILY           l



     mL)                                          FOR            



     subcutaneou                                         DIABETES           



     s pen                                                        

 

     Tresiba      2022      Yes                      INJECT 68           [facility]

[facility]



     FlexTouch      1-26                               UNITS           st



     U-100 100      00:00:                               SUBCUTANEO           [facility]

spita



     unit/mL (3      00                                 USLY DAILY           l



     mL)                                          FOR            



     subcutaneou                                         DIABETES           



     s pen                                                        

 

     Tresiba      2022      Yes                      INJECT 68           [facility]

[facility]



     FlexTouch      1-26                               UNITS           st



     U-100 100      00:00:                               SUBCUTANEO           [facility]

spita



     unit/mL (3      00                                 USLY DAILY           l



     mL)                                          FOR            



     subcutaneou                                         DIABETES           



     s pen                                                        

 

     Tresiba      2022      Yes                      INJECT 68           [facility]

[facility]



     FlexTouch      1-26                               UNITS           st



     U-100 100      00:00:                               SUBCUTANEO           [facility]

spita



     unit/mL (3      00                                 USLY DAILY           l



     mL)                                          FOR            



     subcutaneou                                         DIABETES           



     s pen                                                        

 

     Triamcinolo      2022      Yes                      APPLY THIN           

[facility]



     ne        1-11                               LAYER           [facility]



     Acetonide      00:00:                               AFFECTED           -



     0.1 % apply      00                                 AREAS ON           Exte

rna



     externally                                         BUTTOCKS           l



     Cream                                         FOR 2           



                                                  WEEKS/DENAE           



                                                  H              

 

     Triamcinolo      2022- No                       APPLY THIN          

 [facility]



     ne        1-11 02-21                          LAYER           [facility]



     Acetonide      00:00: 00:00                          AFFECTED           -



     0.1 % apply      00   :00                           AREAS ON           Exte

rna



     externally                                         BUTTOCKS           l



     Cream                                         FOR 2           



                                                  WEEKS/DENAE           



                                                  H              

 

     Nitrofurant      2022- No        [id number] 100mg      Take 1         

  [facility]



     oin Monohyd      -29                          capsule           [facility]

old



     Macro 100      00:00: 00:00                          (100 mg           -



     MG oral      00   :00                           total) by           [facility]



     Capsule                                         mouth 2           l



                                                  times           



                                                  daily           

 

     Insulin            Yes       [id number]           INJECT 68           

[facility]



     Degludec      8-17                               UNITS           [facility]



     (Tresiba      00:00:                               SUBCUTANEO           -



     FlexTouch)      00                                 USLY DAILY           Ext

jess[facility]



     100 UNIT/ML                                         FOR            l



     subcutaneou                                         DIABETES           



     s Solution                                                        



     Pen-injecto                                                        



     r                                                           

 

     Insulin Pen            Yes       [id number]           USE 1           

[facility]



     Needle      8-17                               NEEDLE VIA           [facility]



     (Novofine      00:00:                               DEVICE           -



     Pen Needle)      00                                 EVERY           [facility]



     32G X 6 MM                                         MORNING           l



     does not                                         FOR THE           



     apply Misc                                         TRESIBA           



                                                  PEN            

 

     OneTouch            Yes       [id number]           Inject 1           

[facility]



     Delica      8-17                               Lancet           [facility]



     Lancets 33G      00:00:                               into the           -



     does not      00                                 skin 2           [facility]



     apply Misc                                         times           l



                                                  daily           

 

     Insulin            Yes       [id number]           INJECT 68           

[facility]



     Degludec      8-17                               UNITS           [facility]



     (Tresiba      00:00:                               SUBCUTANEO           -



     FlexTouch)      00                                 USLY DAILY           Ext

jessica



     100 UNIT/ML                                         FOR            l



     subcutaneou                                         DIABETES           



     s Solution                                                        



     Pen-injecto                                                        



     r                                                           

 

     Insulin Pen            Yes       [id number]           USE 1           

[facility]



     Needle      8-17                               NEEDLE VIA           [facility]



     (Novofine      00:00:                               DEVICE           -



     Pen Needle)      00                                 EVERY           [facility]



     32G X 6 MM                                         MORNING           l



     does not                                         FOR THE           



     apply Misc                                         TRESIBA           



                                                  PEN            

 

     OneTouch            Yes       [id number]           Inject 1           

[facility]



     Delica      8-17                               Lancet           [facility]



     Lancets 33G      00:00:                               into the           -



     does not      00                                 skin 2           [facility]



     apply Misc                                         times           l



                                                  daily           

 

     Insulin      0      Yes       [id number]           INJECT 68           

[facility]



     Degludec      8-17                               UNITS           [facility]



     (Tresiba      00:00:                               SUBCUTANEO           -



     FlexTouch)      00                                 USLY DAILY           Ext

jessica



     100 UNIT/ML                                         FOR            l



     subcutaneou                                         DIABETES           



     s Solution                                                        



     Pen-injecto                                                        



     r                                                           

 

     Insulin Pen            Yes       [id number]           USE 1           

[facility]



     Needle      8-17                               NEEDLE VIA           [facility]



     (Novofine      00:00:                               DEVICE           -



     Pen Needle)      00                                 EVERY           [facility]



     32G X 6 MM                                         MORNING           l



     does not                                         FOR THE           



     apply Misc                                         TRESIBA           



                                                  PEN            

 

     OneTouch            Yes       [id number]           Inject 1           

[facility]



     Delica      8-17                               Lancet           [facility]



     Lancets 33G      00:00:                               into the           -



     does not      00                                 skin 2           [facility]



     apply Misc                                         times           l



                                                  daily           

 

     Nystatin      0      Yes                      Apply 1           [facility]



     [id number]      7-14                               applicatio           [facility]



     UNIT/GM      00:00:                               n              -



     apply      00                                 topically           [facility]



     externally                                         2 times           l



     Cream                                         daily           

 

     nystatin      0      Yes                      Apply           [facility]



     (MYCOSTATIN      7-14                               topically.           st



     ) 100,000      00:00:                                              [facility]



     unit/gram      00                                                l



     cream                                                        

 

     nystatin      0      Yes                      Apply           [facility]



     (MYCOSTATIN      7-14                               topically.           st



     ) 100,000      00:00:                                              [facility]



     unit/gram      00                                                l



     cream                                                        

 

     nystatin      0      Yes                      Apply           [facility]



     (MYCOSTATIN      7-14                               topically.           st



     ) 100,000      00:00:                                              [facility]



     unit/gram      00                                                l



     cream                                                        

 

     nystatin      -0      Yes                      Apply           [facility]



     (MYCOSTATIN      7-14                               topically.           st



     ) 100,000      00:00:                                              [facility]



     unit/gram      00                                                l



     cream                                                        

 

     nystatin      -0      Yes                      Apply           [facility]



     (MYCOSTATIN      7-14                               topically.           st



     ) 100,000      00:00:                                              [facility]



     unit/gram      00                                                l



     cream                                                        

 

     nystatin      0      Yes                      Apply           [facility]



     (MYCOSTATIN      7-14                               topically.           st



     ) 100,000      00:00:                                              [facility]



     unit/gram      00                                                l



     cream                                                        

 

     Nystatin      0 - No                       Apply 1           [facility]

y



     [id number]      7-14 02-21                          applicatio           [facility]

d



     UNIT/GM      00:00: 00:00                          n              -



     apply      00   :00                           topically           [facility]



     externally                                         2 times           l



     Cream                                         daily           

 

     Glucose      0      Yes       [id number]           USE TO           [facility]

[facility]



     Blood      6-07                               CHECK           [facility]



     (OneTouch      00:00:                               GLUCOSE           -



     Ultra) in      00                                 TWICE           [facility]



     vitro Strip                                         DAILY           l

 

     Glucose            Yes       [id number]           USE TO           [facility]

[facility]



     Blood      6-07                               CHECK           [facility]



     (OneTouch      00:00:                               GLUCOSE           -



     Ultra) in      00                                 TWICE           [facility]



     vitro Strip                                         DAILY           l

 

     Glucose      0      Yes       [id number]           USE TO           [facility]

[facility]



     Blood      6-07                               CHECK           [facility]



     (OneTouch      00:00:                               GLUCOSE           -



     Ultra) in      00                                 TWICE           [facility]



     vitro Strip                                         DAILY           l

 

     ASPIRIN 81      0      Yes            1{tbl}      Take 1           [facility]

[facility]



     OR        4-25                               tablet by           [facility]



               11:09:                               mouth           



               45                                 daily           

 

     Latanoprost      0      Yes            1[drp]      Place 1           [facility]

[facility]



     0.005 %      3-28                               drop into           [facility]



     ophthalmic      00:00:                               both eyes           -



     Solution      00                                 at bedtime           Exter

na



                                                                 l

 

     Latanoprost      0      Yes            1[drp]      Place 1           [facility]

[facility]



     0.005 %      3-28                               drop into           [facility]



     ophthalmic      00:00:                               both eyes           -



     Solution      00                                 at bedtime           Exter

na



                                                                 l

 

     Latanoprost      0      Yes            1[drp]      Place 1           [facility]

[facility]



     0.005 %      3-28                               drop into           [facility]



     ophthalmic      00:00:                               both eyes           -



     Solution      00                                 at bedtime           Exter

na



                                                                 l

 

     Latanoprost      0      Yes            1[drp]      Place 1           [facility]

[facility]



     0.005 %      3-28                               drop into           [facility]



     ophthalmic      00:00:                               both eyes           



     Solution      00                                 at bedtime           

 

     latanoprost      0      Yes            1[drp]      Apply 1           [facility]

[facility]



     (XALATAN)      3-28                               drop to           st



     0.005 %      00:00:                               eye.           [facility]



     ophthalmic      00                                                l



     solution                                                        

 

     latanoprost      2022-0      Yes            1[drp]      Apply 1           [facility]

[facility]



     (XALATAN)      3-28                               drop to           st



     0.005 %      00:00:                               eye.           [facility]



     ophthalmic      00                                                l



     solution                                                        

 

     latanoprost      -0      Yes            1[drp]      Apply 1           [facility]

[facility]



     (XALATAN)      3-28                               drop to           st



     0.005 %      00:00:                               [facility].           [facility]



     ophthalmic      00                                                l



     solution                                                        

 

     latanoprost      -0      Yes            1[drp]      Apply 1           M

ethodi



     (XALATAN)      3-28                               drop to           st



     0.005 %      00:00:                               eye.           [facility]



     ophthalmic      00                                                l



     solution                                                        

 

     latanoprost      -0      Yes            1[drp]      Apply 1           M

ethodi



     (XALATAN)      3-28                               drop to           st



     0.005 %      00:00:                               eye.           [facility]



     ophthalmic      00                                                l



     solution                                                        

 

     latanoprost      -0      Yes            1[drp]      Apply 1           M

ethodi



     (XALATAN)      3-28                               drop to           st



     0.005 %      00:00:                               eye.           [facility]



     ophthalmic      00                                                l



     solution                                                        

 

     Insulin Pen            Yes       [id number]           USE 1           

[facility]



     Needle      3-10                               NEEDLE VIA           [facility]



     (Novofine      00:00:                               DEVICE           



     Pen Needle)      00                                 EVERY           



     32G X 6 MM                                         MORNING           



     does not                                         FOR THE           



     apply Misc                                         [facility]           



                                                  PEN            

 

     Irbesartan      -0      Yes            150mg      Take 150           [facility]

[facility]



     150 MG oral      2-03                               mg by           [facility]



     Tablet      00:00:                               mouth           



               00                                 daily for           



                                                  blood           



                                                  pressure           

 

     Irbesartan-      -0      Yes       [id number] 1{tbl}      Take 1         

  [facility]



     hydroCHLORO      2-02                               tablet by           [facility]

[facility]



     thiazide      00:00:                               mouth           -



     150-12.5 MG      00                                 daily           [facility]



     oral Tablet                                                        l

 

     Irbesartan-      -0      Yes       [id number] 1{tbl}      Take 1         

  [facility]



     hydroCHLORO      2-02                               tablet by           [facility]

[facility]



     thiazide      00:00:                               mouth           



     150-12.5 MG      00                                 daily           



     oral Tablet                                                        

 

     irbesartan-      -0      Yes            1{tbl} QD   Take 1           Me

[facility]



     hydrochloro      2-02                               tablet by           st



     thiazide      00:00:                               mouth           [facility]



     (AVALIDE)      00                                 daily.           l



     150-12.5 mg                                                        



     per tablet                                                        

 

     irbesartan-      -0      Yes            1{tbl} QD   Take 1           Me

[facility]



     hydrochloro      2-02                               tablet by           st



     thiazide      00:00:                               mouth           [facility]



     (AVALIDE)      00                                 daily.           l



     150-12.5 mg                                                        



     per tablet                                                        

 

     irbesartan-      -0      Yes            1{tbl} QD   Take 1           Me

[facility]odi



     hydrochloro      2-02                               tablet by           st



     thiazide      00:00:                               mouth           [facility]



     (AVALIDE)      00                                 daily.           l



     150-12.5 mg                                                        



     per tablet                                                        

 

     irbesartan-      -0      Yes            1{tbl} QD   Take 1           Me

thodi



     hydrochloro      2-02                               tablet by           st



     thiazide      00:00:                               mouth           [facility]



     (AVALIDE)      00                                 daily.           l



     150-12.5 mg                                                        



     per tablet                                                        

 

     irbesartan-      -0      Yes            1{tbl} QD   Take 1           Me

thodi



     hydrochloro      2-02                               tablet by           st



     thiazide      00:00:                               mouth           [facility]



     (AVALIDE)      00                                 daily.           l



     150-12.5 mg                                                        



     per tablet                                                        

 

     irbesartan-      -0      Yes            1{tbl} QD   Take 1           Me

thodi



     hydrochloro      2-02                               tablet by           st



     thiazide      00:00:                               mouth           [facility]



     (AVALIDE)      00                                 daily.           l



     150-12.5 mg                                                        



     per tablet                                                        

 

     Vitamin D,            Yes                                     [facility]



     Ergocalcife      1-28                                              [facility]



     rol, 1.25      00:00:                                              



     MG (00515      00                                                



     UT) oral                                                        



     Capsule                                                        

 

     ASPIRIN 81            Yes            1{tbl}      Take 1           [facility]

[facility]



     OR        1-26                               tablet by           [facility]



               11:04:                               mouth           



               10                                 daily           

 

     OneTouch            Yes       [id number]           Inject 1           

[facility]



     Delica      1-26                               Lancet           [facility]



     Lancets 33G      00:00:                               into the           



     does not      00                                 skin 2           



     apply Misc                                         times           



                                                  daily           

 

     Irbesartan-      0      Yes       [id number] 1{tbl}      Take 1         

  [facility]



     hydroCHLORO      1-26                               tablet by           [facility]

[facility]



     thiazide      00:00:                               mouth           



     150-12.5 MG      00                                 daily           



     oral Tablet                                                        

 

     Continuous      0      Yes       [id number]           Use as           

[facility]



     Blood Gluc      1-26                               directed           [facility]

ld



           00:00:                                              



     (FreeStyle      00                                                



     Lilly 14                                                        



     Day [place])                                                        



     does not                                                        



     apply                                                        



     Device                                                        

 

     Continuous            Yes       [id number]           Use as           

[facility]



     Blood Gluc      1-26                               directed           [facility]

[facility]



     Sensor      00:00:                                              



     (FreeStyle      00                                                



     Lilly 14                                                        



     Day Sensor)                                                        



     does not                                                        



     apply Misc                                                        

 

     Insulin            Yes       [id number]           INJECT 68           

[facility]



     Degludec      1-26                               UNITS           [facility]



     (Tresiba      00:00:                               SUBCUTANEO           



     FlexTouch)      00                                 USLY DAILY           



     100 UNIT/ML                                         FOR            



     subcutaneou                                         DIABETES           



     s Solution                                                        



     Pen-injecto                                                        



     r                                                           

 

     OneTouch            Yes       [id number]           Inject 1           

[facility]



     Delica      1-26                               Lancet           [facility]



     Lancets 33G      00:00:                               into the           



     does not      00                                 skin 2           



     apply Misc                                         times           



                                                  daily           

 

     Continuous            Yes       [id number]           Use as           

[facility]



     Blood Gluc      1-26                               directed           [facility]

ld



           00:00:                                              



     (FreeStyle      00                                                



     Lilly 14                                                        



     Day [place])                                                        



     does not                                                        



     apply                                                        



     Device                                                        

 

     Continuous            Yes       [id number]           Use as           

[facility]



     Blood Gluc      1-26                               directed           [facility]

ld



     Sensor      00:00:                                              



     (FreeStyle      00                                                



     Lilly 14                                                        



     Day Sensor)                                                        



     does not                                                        



     apply Misc                                                        

 

     Insulin            Yes       [id number]           INJECT 68           

[facility]



     Degludec      1-26                               UNITS           [facility]



     (Tresiba      00:00:                               SUBCUTANEO           



     FlexTouch)      00                                 USLY DAILY           



     100 UNIT/ML                                         FOR            



     subcutaneou                                         DIABETES           



     s Solution                                                        



     Pen-injecto                                                        



     r                                                           

 

     Nitrofurant            Yes       [id number] 100mg      Take 1          

 [facility]



     oin Monohyd      1-19                               capsule           [facility]

[facility]



     Macro 100      00:00:                               (100 mg           



     MG oral      00                                 total) by           



     Capsule                                         mouth 2           



                                                  times           



                                                  daily           

 

     Nitrofurant            Yes       [id number] 100mg      Take 1          

 [facility]



     oin Monohyd      1-19                               capsule           [facility]

ld



     Macro 100      00:00:                               (100 mg           



     MG oral      00                                 total) by           



     Capsule                                         mouth 2           



                                                  times           



                                                  daily           

 

     ASPIRIN 81            Yes            1{tbl}      Take 1           [facility]

[facility]



     OR        1-17                               tablet by           [facility]



               11:26:                               mouth           



               04                                 daily           

 

     Irbesartan-      2021      Yes                                     [facility]



     hydroCHLORO      1-10                                              [facility]



     thiazide      00:00:                                              



     150-12.5 MG      00                                                



     oral Tablet                                                        

 

     Irbesartan-      2021- No                                      [facility]

y



     hydroCHLORO      1-10 01-26                                         [facility]



     thiazide      00:00: 00:00                                         



     150-12.5 MG      00   :00                                          



     oral Tablet                                                        

 

     OneTouch      2021      Yes                                     [facility]



     Delica      0-27                                              [facility]



     Lancets 33G      00:00:                                              



     does not      00                                                



     apply Misc                                                        

 

     OneTouch      -1 2022- No                                      [facility]



     Delica      0-27 01-26                                         [facility]



     Lancets 33G      00:00: 00:00                                         



     does not      00   :00                                          



     apply Misc                                                        

 

     Cholecalcif            Yes                                     [facility]



     shadi      8-06                                              [facility]



     (Vitamin      00:00:                                              



     D3) 1.25 MG      00                                                



     (39285 UT)                                                        



     oral                                                        



     Capsule                                                        

 

     Metformin            Yes                                     [facility]



     HCl 500 MG      8-06                                              [facility]



     oral Tablet      00:00:                                              



               00                                                

 

     Cholecalcif      0      Yes                                     [facility]



     shadi      8-06                                              [facility]



     (Vitamin      00:00:                                              



     D3) 1.25 MG      00                                                



     (35426 UT)                                                        



     oral                                                        



     Capsule                                                        

 

     Metformin            Yes                                     [facility]



     HCl 500 MG      8-06                                              [facility]



     oral Tablet      00:00:                                              



               00                                                

 

     Cholecalcif            Yes                                     [facility]



     shadi      8-06                                              [facility]



     (Vitamin      00:00:                                              



     D3) 1.25 MG      00                                                



     (49010 UT)                                                        



     oral                                                        



     Capsule                                                        

 

     Metformin            Yes                                     [facility]



     HCl 500 MG      8-06                                              [facility]



     oral Tablet      00:00:                                              



               00                                                

 

     Metformin      -0 2- No                                      [facility]



     HCl 500 MG      8-06 11-30                                         [facility]



     oral Tablet      00:00: 00:00                                         -



               00   :00                                          Externa



                                                                 l

 

     Glucose            Yes                      USE TO           [facility]



     Blood      6-18                               CHECK           [facility]



     (OneTouch      00:00:                               GLUCOSE           



     Ultra) in                                       TWICE           



     vitro Strip                                         DAILY           

 

     Glucose            Yes                      USE TO           [facility]



     Blood      6-18                               CHECK           [facility]



     (OneTouch      00:00:                               GLUCOSE           



     Ultra) in      00                                 TWICE           



     vitro Strip                                         DAILY           

 

     Glucose            Yes                      USE TO           [facility]



     Blood      6-18                               CHECK           [facility]



     (OneTouch      00:00:                               GLUCOSE           



     Ultra) in      00                                 TWICE           



     vitro Strip                                         DAILY           

 

     Insulin Pen            Yes                      USE 1           [facility]

y



     Needle      5-06                               NEEDLE VIA           [facility]



     (Novofine      00:00:                               DEVICE           



     Pen Needle)      00                                 EVERY           



     32G X 6 MM                                         MORNING           



     does not                                         FOR THE           



     apply Misc                                         [facility]           



                                                  PEN            

 

     Insulin Pen            Yes                      USE 1           [facility]

y



     Needle      5-06                               NEEDLE VIA           [facility]



     (Novofine      00:00:                               DEVICE           



     Pen Needle)      00                                 EVERY           



     32G X 6 MM                                         MORNING           



     does not                                         FOR THE           



     apply Misc                                         TR[facility]           



                                                  PEN            

 

     Insulin            Yes                      INJECT 68           [facility]

y



     Degludec      1-01                               UNITS           [facility]



     (Tresiba      00:00:                               SUBCUTANEO           



     FlexTouch)      00                                 USLY DAILY           



     100 UNIT/ML                                         FOR            



     subcutaneou                                         DIABETES           



     s Solution                                                        



     Pen-injecto                                                        



     r                                                           

 

     Insulin      2022- No                       INJECT 68           [facility]



     Degludec       01-26                          UNITS           [facility]



     (Tresiba      00:00: 00:00                          SUBCUTANEO           



     FlexTouch)      00   :00                           USLY DAILY           



     100 UNIT/ML                                         FOR            



     subcutaneou                                         DIABETES           



     s Solution                                                        



     Pen-injecto                                                        



     r                                                           







Vital Signs







             Vital Name   Observation Time Observation Value Comments     Source

 

             Systolic blood 2023 16:20:00 141 mm[Hg]                [facility]

 [facility] -



             pressure                                            External

 

             Diastolic blood 2023 16:20:00 76 mm[Hg]                 [facility]

y [facility] -



             pressure                                            External

 

             Heart rate   2023 16:20:00 71 /min                   [facility]

[facility] -



                                                                 External

 

             Body temperature 2023 16:20:00 36.89 Laura                 [facility] corona [facility] -



                                                                 External

 

             Respiratory rate 2023 16:20:00 19 /min                   [facility] corona [facility] -



                                                                 External

 

             Body height  2023 16:20:00 160 cm                    [facility]

[facility] -



                                                                 External

 

             Body weight  2023 16:20:00 91.627 kg                 [facility]

[facility] -



                                                                 External

 

             BMI          2023 16:20:00 35.78 kg/m2               [facility]

[facility] -



                                                                 External

 

             Systolic blood 2023 14:02:00 142 mm[Hg]                [facility]

 [facility] -



             pressure                                            External

 

             Diastolic blood 2023 14:02:00 76 mm[Hg]                 [facility]

y [facility] -



             pressure                                            External

 

             Heart rate   2023 14:02:00 82 /min                   [facility]

[facility] -



                                                                 External

 

             Body temperature 2023 14:02:00 37.06 Laura                 [facility]

ey [facility] -



                                                                 External

 

             Respiratory rate 2023 14:02:00 14 /min                   [facility]

ey [facility] -



                                                                 External

 

             Body height  2023 14:02:00 160 cm                    [facility]

[facility] -



                                                                 External

 

             Body weight  2023 14:02:00 94.802 kg                 [facility]

eybold -



                                                                 External

 

             BMI          2023 14:02:00 37.02 kg/m2               Minoo S

eybold -



                                                                 External

 

             Oxygen saturation in 2023 14:02:00 99 /min                   

Minoo Seybold -



             Arterial blood by                                        External



             Pulse oximetry                                        

 

             Systolic blood 2022 14:09:00 144 mm[Hg]                Minoo

 Seybold -



             pressure                                            External

 

             Diastolic blood 2022 14:09:00 82 mm[Hg]                 Matthewse

y Seybold -



             pressure                                            External

 

             Heart rate   2022 14:09:00 77 /min                   Minoo S

eybold -



                                                                 External

 

             Body temperature 2022 14:09:00 37 Laura                    Tessy

ey Seybold -



                                                                 External

 

             Respiratory rate 2022 14:09:00 14 /min                   Tessy

ey Seybold -



                                                                 External

 

             Body height  2022 14:09:00 160 cm                    Miono S

eybold -



                                                                 External

 

             Body weight  2022 14:09:00 93.895 kg                 Minoo S

eybold -



                                                                 External

 

             BMI          2022 14:09:00 36.67 kg/m2               Minoo S

eybold -



                                                                 External

 

             Oxygen saturation in 2022 14:09:00 99 /min                   

Minoo Seybold -



             Arterial blood by                                        External



             Pulse oximetry                                        

 

             Systolic blood 2022 16:03:00 142 mm[Hg]                Minoo

 Seybold



             pressure                                            

 

             Diastolic blood 2022 16:03:00 70 mm[Hg]                 Matthewse

y Seybold



             pressure                                            

 

             Heart rate   2022 16:03:00 69 /min                   Minoo S

eybold

 

             Body temperature 2022 16:03:00 36.28 Laura                 Tessy

ey Seybold

 

             Respiratory rate 2022 16:03:00 14 /min                   Tessy

ey Seybold

 

             Body height  2022 16:03:00 160 cm                    Minoo S

eybold

 

             Body weight  2022 16:03:00 92.08 kg                  Minoo S

eybold

 

             BMI          2022 16:03:00 35.96 kg/m2               Minoo S

eybold

 

             Systolic blood 2022 16:56:00 148 mm[Hg]                Minoo

 Seybold



             pressure                                            

 

             Diastolic blood 2022 16:56:00 60 mm[Hg]                 Kelse

y Seybold



             pressure                                            

 

             Heart rate   2022 16:56:00 75 /min                   Minoo S

eybold

 

             Body temperature 2022 16:56:00 36.17 Laura                 Tessy

ey Seybold

 

             Respiratory rate 2022 16:56:00 14 /min                   Tessy

ey Seybold

 

             Body height  2022 16:56:00 160 cm                    Minoo S

eybold

 

             Body weight  2022 16:56:00 92.987 kg                 Minoo S

eybold

 

             BMI          2022 16:56:00 36.31 kg/m2               Minoo S

eybold

 

             Systolic blood 2022 17:16:00 156 mm[Hg]                Minoo

 Seybold



             pressure                                            

 

             Diastolic blood 2022 17:16:00 76 mm[Hg]                 Kelse

y Seybold



             pressure                                            

 

             Heart rate   2022 17:16:00 86 /min                   Minoo S

eybold

 

             Body temperature 2022 17:16:00 36.89 Laura                 Tessy

ey Seybold

 

             Respiratory rate 2022 17:16:00 14 /min                   Tessy

ey Seybold

 

             Body height  2022 17:16:00 160 cm                    Minoo S

eybold

 

             Body weight  2022 17:16:00 93.441 kg                 Minoo S

eybold

 

             BMI          2022 17:16:00 36.49 kg/m2               Minoo S

eybold

 

             Systolic blood 2023 20:58:00 132 mm[Hg]                Method

[facility]



             pressure                                            

 

             Diastolic blood 2023 20:58:00 82 mm[Hg]                 [facility]



             pressure                                            

 

             Heart rate   2023 19:45:00 92 /min                   [facility]

 

             Body height  2023 19:45:00 160 cm                    [facility]

 

             Body weight  2023 19:45:00 92.534 kg                 [facility]

 

             BMI          2023 19:45:00 36.14 kg/m2               [facility]

 

             Oxygen saturation in 2023 19:45:00 95 /min                   

[facility]



             Arterial blood by                                        



             Pulse oximetry                                        

 

             Systolic blood 2022 20:25:00 172 mm[Hg]                [facility]



             pressure                                            

 

             Diastolic blood 2022 20:25:00 80 mm[Hg]                 [facility]

[facility]



             pressure                                            

 

             Heart rate   2022 19:01:00 68 /min                   [facility]

 

             Body height  2022 19:01:00 160 cm                    [facility]

 

             Body weight  2022 19:01:00 92.534 kg                 [facility]

 

             BMI          2022 19:01:00 36.14 kg/m2               [facility]

 

             Oxygen saturation in 2022 19:01:00 97 /min                   

[facility]



             Arterial blood by                                        



             Pulse oximetry                                        







Procedures







                Procedure       Date / Time Performed Performing Clinician Sour

e

 

                ECG 12-LEAD     2023 19:47:13 [facility]

 

                ECG 12-LEAD     2022 18:59:57 [facility]

 

                TTE COMPLETE, WO 2022 18:31:11 [facility]



                CONTRAST, W DOPPLER                                 



                ([id number])                                         







Plan of Care







             Planned Activity Planned Date Details      Comments     Source

 

             Future Scheduled 2023   COVID-19 VACCINE (#1)              [facility]



             Test         16:08:09     [code = COVID-19              



                                       VACCINE (#1)]              

 

             Future Scheduled 2023   SHINGLES VACCINES (1              Met

[facility] [place] [place]



             Test         16:08:09     of 2) [code = SHINGLES              



                                       VACCINES (1 of 2)]              

 

             Future Scheduled 2023   65+ PNEUMOCOCCAL              [email or web address]



             Test         16:08:09     VACCINE (1 - PCV)              



                                       [code = 65+               



                                       PNEUMOCOCCAL VACCINE              



                                       (1 - PCV)]                

 

             Future Scheduled 2023   INFLUENZA VACCINE              Method

[facility]



             Test         16:08:09     [code = INFLUENZA              



                                       VACCINE]                  

 

             Future Scheduled 2023   COVID-19 VACCINE (#1)              [facility]



             Test         08:32:19     [code = COVID-19              



                                       VACCINE (#1)]              

 

             Future Scheduled 2023   SHINGLES VACCINES (1              Met

[facility]



             Test         08:32:19     of 2) [code = SHINGLES              



                                       VACCINES (1 of 2)]              

 

             Future Scheduled 2023   65+ PNEUMOCOCCAL              [facility]



             Test         08:32:19     VACCINE (1 - PCV)              



                                       [code = 65+               



                                       PNEUMOCOCCAL VACCINE              



                                       (1 - PCV)]                

 

             Future Scheduled 2023   INFLUENZA VACCINE              [facility]

[facility]



             Test         08:32:19     [code = INFLUENZA              



                                       VACCINE]                  

 

             Future Scheduled 2023   COVID-19 VACCINE (#1)              [facility]



             Test         10:44:14     [code = COVID-19              



                                       VACCINE (#1)]              

 

             Future Scheduled 2023   SHINGLES VACCINES (1              Met

[facility]



             Test         10:44:14     of 2) [code = SHINGLES              



                                       VACCINES (1 of 2)]              

 

             Future Scheduled 2023   65+ PNEUMOCOCCAL              Methodi

 Hospital



             Test         10:44:14     VACCINE (1 - PCV)              



                                       [code = 65+               



                                       PNEUMOCOCCAL VACCINE              



                                       (1 - PCV)]                

 

             Future Scheduled 2023   INFLUENZA VACCINE              Method

[facility]



             Test         10:44:14     [code = INFLUENZA              



                                       VACCINE]                  

 

             Future Scheduled 2023   COVID-19 VACCINE (#1)              [facility]



             Test         21:52:54     [code = COVID-19              



                                       VACCINE (#1)]              

 

             Future Scheduled 2023   SHINGLES VACCINES (1              Met

[facility]



             Test         21:52:54     of 2) [code = SHINGLES              



                                       VACCINES (1 of 2)]              

 

             Future Scheduled 2023   65+ PNEUMOCOCCAL              Methodi

 Hospital



             Test         21:52:54     VACCINE (1 - PCV)              



                                       [code = 65+               



                                       PNEUMOCOCCAL VACCINE              



                                       (1 - PCV)]                

 

             Future Scheduled 2023   INFLUENZA VACCINE              Method

[facility]



             Test         21:52:54     [code = INFLUENZA              



                                       VACCINE]                  

 

             Future Scheduled 2023   COVID-19 VACCINE (#1)              [facility]



             Test         21:52:54     [code = COVID-19              



                                       VACCINE (#1)]              

 

             Future Scheduled 2023   SHINGLES VACCINES (1              Met

[facility]



             Test         21:52:54     of 2) [code = SHINGLES              



                                       VACCINES (1 of 2)]              

 

             Future Scheduled 2023   65+ PNEUMOCOCCAL              Methodi

 Hospital



             Test         21:52:54     VACCINE (1 - PCV)              



                                       [code = 65+               



                                       PNEUMOCOCCAL VACCINE              



                                       (1 - PCV)]                

 

             Future Scheduled 2023   INFLUENZA VACCINE              Method

[facility]



             Test         21:52:54     [code = INFLUENZA              



                                       VACCINE]                  

 

             Future Scheduled 2023   COVID-19 VACCINE (#1)              [facility]



             Test         21:52:54     [code = COVID-19              



                                       VACCINE (#1)]              

 

             Future Scheduled 2023   SHINGLES VACCINES (1              Met

[facility]



             Test         21:52:54     of 2) [code = SHINGLES              



                                       VACCINES (1 of 2)]              

 

             Future Scheduled 2023   65+ PNEUMOCOCCAL              Methodi

 Hospital



             Test         21:52:54     VACCINE (1 - PCV)              



                                       [code = 65+               



                                       PNEUMOCOCCAL VACCINE              



                                       (1 - PCV)]                

 

             Future Scheduled 2023   INFLUENZA VACCINE              Method

[facility]



             Test         21:52:54     [code = INFLUENZA              



                                       VACCINE]                  







Encounters







        Start   End     Encounter Admission Attending Care    Care    Encounter 

Source



        Date/Time Date/Time Type    Type    Clinicians Facility Department ID   

   

 

        2023 Outpatient         MINOO PARIKH  [id number]

87 [place]



        13:00:00 13:00:00                 ONIEL Carreraol

jabier

 

        2023 Outpatient         MINOO PARIKH  [id number]

18 [place]



        13:00:00 13:00:00                 ONIEL                           Seybol

jabier

 

        2023 Outpatient         MINOO PARIKH  [id number]

65 [place]



        08:30:00 08:30:00                 ONIEL                           Seybol

jabier

 

        2023 Outpatient         LAB90   MINOO MAR  [id number]

72 [place]



        12:00:00 12:00:00                                                 Seybol

d

 

        2023 Outpatient         MINOO FOSTER  [id number]

17 [place]



        11:30:00 11:30:00                 ANDERS                          Seybol

d

 

        2023 Outpatient         MINOO HICKEY  [id number]

838 [place]



        00:00:00 00:00:00                 TING                           Seybol

jabier

 

        2023 Outpatient         ERIBERTO MAR  121

[id number] [facility]



        00:00:00 00:00:00                 MD JOLYNN                         Seybol

jabier

 

        2023 Outpatient         MINOO PARIKH  [id number]

31 [place]



        00:00:00 00:00:00                 ONIEL Subramanianybol

jabier

 

        2023 Office          Fozia,  1.2.840.1 [id number]

0579 Methodi



        14:00:00 16:00:45 Visit           Dwaynekyle Carvalho 96288.1.1         176     

st



                                                3.430.2.7                 Hospit

a



                                                .3.155015                 l



                                                .8                      

 

        2023 Travel                  1.2.840.1 1.2.672.344 [id number]

[id number] Methodi



        00:00:00 00:00:00                         52637.1.1 350.1.13.43 226     

st



                                                3.430.2.7 0.2.7.3.698         Ho

spita



                                                .3.041009 084.8           l



                                                .8                      

 

        2023 [facility]         [facility]     [id number]

[address] [place]



        00:00:00 00:00:00                 DWAYNE                   176     Method

i



                                                                        st

 

        2023 Outpatient         LAB90   MINOO MAR  [id number]

92 [place]



        08:15:00 08:15:00                                                 Seybol

d

 

        2023 Outpatient         MINOO HICKEY  [id number]

675 [place]



        00:00:00 00:00:00                 TING                           Seybol

d

 

        2023 Outpatient         MINOO HICKEY  [id number]

591 [place]



        08:00:00 08:00:00                 TING                           Seybol

d

 

        2023 Outpatient         MINOO HICKEY  [id number]

787 [place]



        00:00:00 00:00:00                 TING                           Seybol

d

 

        2022 Outpatient         MINOO HICKEY  [id number]

961 [place]



        00:00:00 00:00:00                 TING                           Seybol

d

 

        2022 Outpatient         MINOO HICKEY  [id number]

108 [place]



        00:00:00 00:00:00                 TING                           Seybol

d

 

        2022 Office          Abad,  1.2.840.1 [id number]

2971 Methodi



        13:00:00 14:33:45 Visit           Dwaynekyle Carvalho 37852.1.1         114     

st



                                                3.430.2.7                 Hospit

a



                                                .3.728790                 l



                                                .8                      

 

        2022 Office          Fozia,  1.2.840.1 [id number]

2971 Method[facility]



        13:00:00 14:33:45 Visit           Dwaynekyle Carvalho 37358.1.1         114     

st



                                                3.430.2.7                 Hospit

a



                                                .3.897160                 l



                                                .8                      

 

        2022 Travel                  1.2.840.1 1.2.397.929 2121

[id number] Methodi



        00:00:00 00:00:00                         73281.1.1 350.1.13.43 862     

st



                                                3.430.2.7 0.2.7.3.698         Ho

spita



                                                .3.443343 084.8           l



                                                .8                      

 

        2022 Outpatient         FOZIA,  [facility]     [id number]

[address] [place]



        00:00:00 00:00:00                 DWAYNEKYLE Tomlin     Method

i



                                                                        st

 

        2022 Travel                  1.2.840.1 1.2.149.333 2686

[id number] Methodi



        00:00:00 00:00:00                         93113.1.1 350.1.13.43 862     

st



                                                3.430.2.7 0.2.7.3.698         Ho

spita



                                                .3.427744 084.8           l



                                                .8                      

 

        2022 Outpatient         MINOO HICKEY  [id number]

561 [place]



        00:00:00 00:00:00                 TING eugene

 

        2022 Orders          Walter-Brigida 1.2.840.1 [id number]

[id number] Methodi



        00:00:00 00:00:00 Only            an, Elyssa 45492.1.1         150     st



                                        Chanel  3.430.2.7                 Hospit

a



                                                .3.953571                 l



                                                .8                      

 

        2022 Orders          Walter-Brigida 1.2.840.1 [id number]

[id number] Methodi



        00:00:00 00:00:00 Only            an, Elyssa 26860.1.1         150     st



                                        Chanel  3.430.2.7                 Hospit

a



                                                .3.244592                 l



                                                .8                      

 

        2022 Outpatient         MINOO HICKEY  [id number]

398 [place]



        00:00:00 00:00:00                 TING                           Seybol

d

 

        2022 Outpatient         MINOO HICKEY  [id number]

017 [place]



        00:00:00 00:00:00                 TING                           Seybol

d

 

        2022 Outpatient         LAB90   MINOO MAR  [id number]

93 [place]



        09:10:00 09:10:00                                                 Seybol

d

 

        2022 Outpatient         MINOO HICKEY  [id number]

314 [place]



        08:15:00 08:15:00                 TING                           Seybol

d

 

        2022 Outpatient         MINOO FOSTER  [id number]

33 [place]



        10:45:00 10:45:00                 ANDERS                          Seybol

d

 

        2022 Outpatient         MINOO HICKEY  [id number]

226 [place]



        08:15:00 08:15:00                 TING                           Seybol

d

 

        2022 Outpatient         MINOO HICKEY  [id number]

012 [place]



        00:00:00 00:00:00                 TING                           Seybol

d

 

        2022 Outpatient         LAB90   MINOO MAR  [id number]

10 [place]



        08:05:00 08:05:00                                                 Seybol

d

 

        2022 Outpatient         MINOO HICKEY  [id number]

291 [place]



        00:00:00 00:00:00                 TING                           Seybol

d

 

        2022 Outpatient         LAB90   MINOO MAR  [id number]

36 [place]



        11:00:00 11:00:00                                                 Seybol

d

 

        2022 Office          Nazario Hickey    1.2.840.114 [id number]

5153 [place]



        10:00:00 10:45:00 Visit           Ting Zavala 350.1.13.13         Se

ybkali



                                        Somogyi         1.2.7.2.686         



                                                        [phone]         



                                                        0               

 

        2022 Outpatient         MINOO HICKEY  [id number]

554 [place]



        10:45:00 10:45:00                 TING                           Seybol

d

 

        2022 Office          HomerNazario nation    1.2.840.114 [id number]

1362 [place]



        10:30:00 10:45:00 Visit           Ting Zavala 350.1.13.13         Se

ybold



                                        Somogyi         1.2.7.2.686         



                                                        [phone]         



                                                        0               

 

        2022 Outpatient         MINOO HICKEY  [id number]

053 [place]



        13:30:00 13:30:00                 TING                           Seybol

d

 

        2022 Outpatient         MINOO HICKEY  [id number]

325 [place]



        15:00:00 15:00:00                 TING                           Seybol

d

 

        2022 Office          Nazario Hickey    1.2.840.114 [id number]

6782 [place]



        10:30:00 11:00:00 Visit           Ting Zavala 350.1.13.13         Se

ybold



                                        Somogyi         1.2.7.2.686         



                                                        [phone]         



                                                        0               

 

        2022 Outpatient         MINOO HICKEY  [id number]

506 [place]



        00:00:00 00:00:00                 TING                           Seybol

d

 

        2022 Outpatient         MINOO HICKEY  [id number]

601 [place]



        10:30:00 10:30:00                 TING                           Seybol

d

 

        2022 Outpatient         MINOO HICKEY  [id number]

[address]



        00:00:00 00:00:00                 TING                           Seybol

d

 

        2022 Outpatient         MINOO HICKEY  [id number]

[address]



        00:00:00 00:00:00                 TING                           Seybol

d

 

        2022 Outpatient         LAB90   MINOO MAR  [id number]

[address]



        11:40:00 11:40:00                                                 Seybol

d

 

        2022 Office          Nazario Hickey    [id number]

[address]



        11:00:00 11:15:00 Visit           Ting   Lucas [id number]         Se

ybold



                                        Somogyi         [id number]         



                                                        [id number]         



                                                        0               

 

        2022-03-10 2022-03-10 Outpatient         MINOO HICKEY  [id number]

[address]



        00:00:00 00:00:00                 TING                           Seybol

d

 

        2022 Outpatient         MINOO HICKEY  [id number]

[address]



        00:00:00 00:00:00                 TING                           Seybol

d

 

        2022 Outpatient         MINOO HICKEY  [id number]

[address]



        00:00:00 00:00:00                 TING                           Seybol

d

 

        2022 Outpatient         MINOO HICKEY  [id number]

[address]



        00:00:00 00:00:00                 TING                           Seybol

d

 

        2022 Office          Juaquin Nazario    [id number]

[address]



        11:00:00 11:30:00 Visit           Ting Zavala [id number]         Se

ybold



                                        Somogyi         [id number]         



                                                        [phone]         



                                                        0               

 

        2022 Outpatient         MINOO HICKEY  [id number]

[address]



        11:15:00 11:15:00                 TING                           Seybol

d

 

        2022 Outpatient         MINOO HICKEY  [id number]

[address]



        11:00:00 11:00:00                 TING                           Seybol

d

 

        2022 Outpatient         LAB90   MINOO MAR  [id number]

[address]



        12:00:00 12:00:00                                                 Seybol

d

 

        2022 Office          Nazario Hickey    [id number]

[address]



        11:15:00 11:45:00 Visit           Ting Zavala [id number]         Se

ybold



                                        Somogyi         [id number]         



                                                        [phone]         



                                                        0               

 

        2022 Outpatient         [facility]    [id number] [place]



        10:47:00 10:47:00                                         [id number]    [facility]



                                                                        Clinics







Results







           Test Description Test Time  Test Comments Results    Result Comments 

Source









                    ECG 12 lead         2023 12:36:11 









                      Test Item  Value      Reference Range Interpretation Comme

nts









             Ventricular rate (test code = 253) 85                              

       

 

             Atrial rate (test code = 255) 85                                   

  

 

             IN interval (test code = 266) 200                                  

  

 

             QRSD interval (test code = 260) 80                                 

    

 

             QT interval (test code = 264) 380                                  

  

 

             QTC interval (test code = 265) 452                                 

   

 

             P axis 1 (test code = 267) 76                                     

 

             QRS axis 1 (test code = 268) 2                                     

 

 

             T wave axis (test code = 270) 18                                   

  

 

             EKG impression (test code = 273) Normal sinus rhythm-Cannot rule ou

t Anterior                           



                          infarct (cited on or before                           



                          28-DEC-2022)-Abnormal ECG-In automated                

           



                          comparison with ECG of 28-DEC-2022                    

       



                          12:59,-Nonspecific T wave abnormality has             

              



                          replaced inverted T waves in Inferior                 

          



                          leads-Electronically Signed By Dwayne Abad MD () on 2023 7:36:03 AM                

           



[address]2022-12-31 00:06:08





             Test Item    Value        Reference Range Interpretation Comments

 

             Ventricular rate (test                                        



             code = 253)                                         

 

             Atrial rate (test code =                                        



             255)                                                

 

             IN interval (test code =                                        



             266)                                                

 

             QRSD interval (test code                                        



             = 260)                                              

 

             QT interval (test code =                                        



             264)                                                

 

             QTC interval (test code                                        



             = 265)                                              

 

             P axis 1 (test code =                                        



             267)                                                

 

             QRS axis 1 (test code =                                        



             268)                                                

 

             T wave axis (test code =                                        



             270)                                                

 

             EKG impression (test Normal sinus                           



             code = 273)  rhythm-Septal infarct                           



                          , age                                  



                          undetermined-Abnormal                           



                          ECG-No previous ECGs                           



                          available-Electronica                           



                          lly Signed By Dwayne Abad MD ()                           



                          on 2022 6:06:04                           



                          PM                                     



[place] [address] [id number] 00:06:08





             Test Item    Value        Reference Range Interpretation Comments

 

             Ventricular rate (test                                        



             code = 253)                                         

 

             Atrial rate (test code =                                        



             255)                                                

 

             IN interval (test code =                                        



             266)                                                

 

             QRSD interval (test code                                        



             = 260)                                              

 

             QT interval (test code =                                        



             264)                                                

 

             QTC interval (test code                                        



             = 265)                                              

 

             P axis 1 (test code =                                        



             267)                                                

 

             QRS axis 1 (test code =                                        



             268)                                                

 

             T wave axis (test code =                                        



             270)                                                

 

             EKG impression (test Normal sinus                           



             code = 273)  rhythm-Septal infarct                           



                          , age                                  



                          undetermined-Abnormal                           



                          ECG-No previous ECGs                           



                          available-Electronica                           



                          lly Signed By Fozia THOMAS Dwayne KShanel ()                           



                          on 2022 6:06:04                           



                          PM                                     



[place] [address] [id number] 00:06:08





             Test Item    Value        Reference Range Interpretation Comments

 

             Ventricular rate (test                                        



             code = 253)                                         

 

             Atrial rate (test code =                                        



             255)                                                

 

             IN interval (test code =                                        



             266)                                                

 

             QRSD interval (test code                                        



             = 260)                                              

 

             QT interval (test code =                                        



             264)                                                

 

             QTC interval (test code                                        



             = 265)                                              

 

             P axis 1 (test code =                                        



             267)                                                

 

             QRS axis 1 (test code =                                        



             268)                                                

 

             T wave axis (test code =                                        



             270)                                                

 

             EKG impression (test Normal sinus                           



             code = 273)  rhythm-Septal infarct                           



                          , age                                  



                          undetermined-Abnormal                           



                          ECG-No previous ECGs                           



                          available-Electronica                           



                          lly Signed By Fozia THOMAS Dwaynekyle PAVON ()                           



                          on 2022 6:06:04                           



                          PM                                     



[place] [address] [id number] 00:06:08





             Test Item    Value        Reference Range Interpretation Comments

 

             Ventricular rate (test                                        



             code = 253)                                         

 

             Atrial rate (test code =                                        



             255)                                                

 

             IN interval (test code =                                        



             266)                                                

 

             QRSD interval (test code                                        



             = 260)                                              

 

             QT interval (test code =                                        



             264)                                                

 

             QTC interval (test code                                        



             = 265)                                              

 

             P axis 1 (test code =                                        



             267)                                                

 

             QRS axis 1 (test code =                                        



             268)                                                

 

             T wave axis (test code =                                        



             270)                                                

 

             EKG impression (test Normal sinus                           



             code = 273)  rhythm-Septal infarct                           



                          , age                                  



                          undetermined-Abnormal                           



                          ECG-No previous ECGs                           



                          available-Electronica                           



                          lly Signed By Fozia THOMAS Dwaynekyle PAVON ()                           



                          on 2022 6:06:04                           



                          PM                                     



[place] [address] [id number] 00:06:08





             Test Item    Value        Reference Range Interpretation Comments

 

             Ventricular rate (test                                        



             code = 253)                                         

 

             Atrial rate (test code =                                        



             255)                                                

 

             IN interval (test code =                                        



             266)                                                

 

             QRSD interval (test code                                        



             = 260)                                              

 

             QT interval (test code =                                        



             264)                                                

 

             QTC interval (test code                                        



             = 265)                                              

 

             P axis 1 (test code =                                        



             267)                                                

 

             QRS axis 1 (test code =                                        



             268)                                                

 

             T wave axis (test code =                                        



             270)                                                

 

             EKG impression (test Normal sinus                           



             code = 273)  rhythm-Septal infarct                           



                          , age                                  



                          undetermined-Abnormal                           



                          ECG-No previous ECGs                           



                          available-Electronica                           



                          lly Signed By Fozia THOMAS, Dwayne PAVON ()                           



                          on 2022 6:06:04                           



                          PM                                     



[facility]

## 2023-05-23 ENCOUNTER — HOSPITAL ENCOUNTER (EMERGENCY)
Dept: HOSPITAL 97 - ER | Age: 83
Discharge: HOME | End: 2023-05-23
Payer: COMMERCIAL

## 2023-05-23 VITALS — DIASTOLIC BLOOD PRESSURE: 88 MMHG | OXYGEN SATURATION: 95 % | SYSTOLIC BLOOD PRESSURE: 164 MMHG

## 2023-05-23 VITALS — TEMPERATURE: 98.4 F

## 2023-05-23 DIAGNOSIS — I10: ICD-10-CM

## 2023-05-23 DIAGNOSIS — R10.9: ICD-10-CM

## 2023-05-23 DIAGNOSIS — E11.9: ICD-10-CM

## 2023-05-23 DIAGNOSIS — M54.50: Primary | ICD-10-CM

## 2023-05-23 LAB
ALBUMIN SERPL BCP-MCNC: 3.3 G/DL (ref 3.4–5)
ALP SERPL-CCNC: 92 U/L (ref 45–117)
ALT SERPL W P-5'-P-CCNC: < 10 U/L (ref 13–56)
AST SERPL W P-5'-P-CCNC: 13 U/L (ref 15–37)
BUN BLD-MCNC: 16 MG/DL (ref 7–18)
GLUCOSE SERPLBLD-MCNC: 179 MG/DL (ref 74–106)
HCT VFR BLD CALC: 39.7 % (ref 36–45)
LIPASE SERPL-CCNC: 20 U/L (ref 13–75)
LYMPHOCYTES # SPEC AUTO: 1.7 K/UL (ref 0.7–4.9)
MCV RBC: 87.8 FL (ref 80–100)
PMV BLD: 8.3 FL (ref 7.6–11.3)
POTASSIUM SERPL-SCNC: 3.9 MEQ/L (ref 3.5–5.1)
RBC # BLD: 4.52 M/UL (ref 3.86–4.86)

## 2023-05-23 PROCEDURE — 85025 COMPLETE CBC W/AUTO DIFF WBC: CPT

## 2023-05-23 PROCEDURE — 81001 URINALYSIS AUTO W/SCOPE: CPT

## 2023-05-23 PROCEDURE — 36415 COLL VENOUS BLD VENIPUNCTURE: CPT

## 2023-05-23 PROCEDURE — 74018 RADEX ABDOMEN 1 VIEW: CPT

## 2023-05-23 PROCEDURE — 80053 COMPREHEN METABOLIC PANEL: CPT

## 2023-05-23 PROCEDURE — 83690 ASSAY OF LIPASE: CPT

## 2023-05-23 NOTE — XMS REPORT
Continuity of Care Document

                             Created on:May 23, 2023



Patient:DAVE DUMONT

Sex:Female

:1940

External Reference #:[id number]





Demographics







                          Address                   [address] [place] [address]

 

                          Home Phone                [phone]

 

                          Mobile Phone              [phone]

 

                          Email Address             [email or web address]

 

                          Preferred Language        English

 

                          Marital Status            Unknown

 

                          Spiritism Affiliation     Unknown

 

                          Race                      Unknown

 

                          Additional Race(s)        Unavailable



                                                    White



                                                    Unavailable

 

                          Ethnic Group              Unknown









Author







                          Organization              [facility]

t

 

                          Address                   [address] [place] [id number]

 

                          Phone                     [phone]









Support







                Name            Relationship    Address         Phone

 

                Favorite, Tehan  Child           Unavailable     [phone]

 

                1               ETHAN            Unavailable     Unavailable

 

                2               Unavailable     Unavailable     Unavailable









Care Team Providers







                    Name                Role                Phone

 

                    Edelmira THOMAS, Ting Jones Primary Care Physician [phone]

 

                    ONIEL PARIKH        Attending Clinician Unavailable

 

                    LAB90               Attending Clinician Unavailable

 

                    ANDERS FOSTER      Attending Clinician Unavailable

 

                    TING HICKEY Attending Clinician Unavailable

 

                    MD MAC  Attending Clinician Unavailable

 

                    Fozia THOMAS, Dwayne Carvalho Attending Clinician [phone]

 

                    Larry RN, Elyssa Buchanan Attending Clinician Unavailable

 

                    Ting Hickey MD Attending Clinician [phone]

[phone]

 

                    WATERS_S            Attending Clinician Unavailable

 

                    HUANG_S            Admitting Clinician Unavailable









Payers







           Payer Name Policy Type Policy Number Effective Date Expiration Date S

ource

 

           [employer] [place] PPO 5          [id number] 2022            



                                            00:00:00              

 

           [employer] (MEDICARE            [id number] 2022            



           REPLACEMENT PPO)                       00:00:00              







Problems







       Condition Condition Condition Status Onset  Resolution Last   Treating Co

mments 

Source



       Name   Details Category        Date   Date   Treatment Clinician        



                                                 Date                 

 

       Immunodefi Immunodefi Disease Active 2022                             DAHIANA barclay



       ciency due ciency due                                              Se

ybold



       to     to                   00:00:                             -



       conditions conditions               00                                 Ex

terna



       classified classified                                                  l



       elsewhere elsewhere                                                  

 

       Uncontroll Uncontroll Disease Active                              DAHIANA barclay



       ed type 2 ed type 2               1-17                               Sey[facility]

old



       diabetes diabetes               00:00:                             -



       mellitus mellitus               00                                 Extern

a



       with   with                                                    l



       hyperglyce hyperglyce                                                  



       salomon    salomon                                                     

 

       Primary Primary Disease Active                              [facility]ino[facility]



       hypertensi hypertensi               1-17                               Se

ybold



       on     on                   00:00:                             -



                                   00                                 Externa



                                                                      l

 

       No known No known Disease                                           Kelse

y



       active active                                                  [facility]



       problems problems                                                  







Allergies, Adverse Reactions, Alerts







       Allergy Allergy Status Severity Reaction(s) Onset  Inactive Treating Comm

ents 

Source



       Name   Type                        Date   Date   Clinician        

 

       Sulfa  Propensi Active        Nausea Only                       Matthew

sey



       Drugs  ty to                       8                        [facility]



              adverse                      00:00:                      -



              reaction                      00                          Externa



              s                                                       l

 

       Amoxicil Propensi Active        Other                        Minoo



       jim    ty to                       8                        [facility]



              adverse                      00:00:                      -



              reaction                      00                          Externa



              s                                                       l

 

       Cephalex Propensi Active        Nausea Only                       K

elsey



       in     ty to                       8                        [facility]



              adverse                      00:00:                      -



              reaction                      00                          Externa



              s                                                       l

 

       Penicill Propensi Active        Other                        Minoo



       in V   ty to                       8                        S[facility]



       Potassiu adverse                      00:00:                      



       m      reaction                      00                          



              s                                                       

 

       Sulfa  Propensi Active        Nausea Only                       Matthew

sey



       Drugs  ty to                                               [facility]



              adverse                      00:00:                      



              reaction                      00                          



              s                                                       

 

       Lisinopr Propensi Active        Other                        Minoo



       il     ty to                       830                        [facility]



              adverse                      00:00:                      -



              reaction                      00                          Externa



              s                                                       l

 

       Lisinopr Propensi Active        Other                        Minoo



       il     ty to                       830                        [facility]



              adverse                      00:00:                      



              reaction                      00                          



              s                                                       







Family History







           Family Member Diagnosis  Comments   Start Date Stop Date  Source

 

           Natural father Heart failure                                  [facility]

 

           Natural mother                                             [facility]







Social History







           Social Habit Start Date Stop Date  Quantity   Comments   Source

 

           Gender identity                                             [facility]



                                                                  [facility]

 

           Sexual orientation                                             [facility]

[facility]



                                                                  [facility]

 

           History of Social 2023                       [facility]



           function   00:00:00   00:00:00                         Hospital

 

           Tobacco use and 2022 Smokeless             [facility]



           exposure   00:00:00   00:00:00   tobacco non-user            Hospital

 

           Sex Assigned At 1940                       [facility]



           Birth      00:00:00   00:00:00                         Hospital









                Smoking Status  Start Date      Stop Date       Source

 

                Never smoked tobacco                                 [facility]

old - External







Medications







       Ordered Filled Start  Stop   Current Ordering Indication Dosage Frequency

 Signature

                    Comments            Components          Source



     Medication Medication Date Date Medication? Clinician                (SIG) 

          



     Name Name                                                   

 

     ASPIRIN 81            Yes            1{tbl}      Take 1           Matthew

sey



     OR        5-16                               tablet by           [facility]



               11:23:                               mouth           -



               09                                 daily           Externa



                                                                 l

 

     Cranberry      2023-0      Yes                      Take by           Kelse

y



     400 MG oral      5-16                               mouth           [facility]



     Capsule      11:23:                                              -



               09                                                Externa



                                                                 l

 

     vit       2023-0      Yes                      Take by           [facility]



     C/E/zinc      4-05                               mouth.           st



     ox/francis/lut      14:48:                                              Hospit

a



     /zeax      09                                                l



     (ICAPS                                                        



     AREDS2                                                        



     ORAL)                                                        

 

     cholecalcif      2023-0      Yes            125ug QD   Take 125           M

ethodi



     shadi,      4-05                               mcg by           



     vitamin D3,      14:48:                               mouth           [facility]os[facility]

ta



     (VITAMIN D3      09                                 daily.           l



     ORAL)                                                        

 

     vit       2023-0      Yes                      Take by           [facility]



     C/E/zinc      4-05                               mouth.           st



     ox/francis/lut      14:48:                                              Hospit

a



     /zeax      09                                                l



     (ICAPS                                                        



     AREDS2                                                        



     ORAL)                                                        

 

     cholecalcif      2023-0      Yes            125ug QD   Take 125           M

ethodi



     shadi,      4-05                               mcg by           



     vitamin D3,      14:48:                               mouth           [facility]

ta



     (VITAMIN D3      09                                 daily.           l



     ORAL)                                                        

 

     aspirin      2023-0      Yes            81mg      Take 1           [facility]



     (ECOTRIN)      4-05                               tablet (81           st



     81 MG      14:46:                               mg total)           [facility]ospita



     enteric      48                                 by mouth.           l



     coated                                                        



     tablet                                                        

 

     cranberry      3-0      Yes                      Take by           [facility]

di



     400 mg      4-05                               mouth.           st



     capsule      14:46:                                              Hospita



               48                                                l

 

     aspirin      2023-0      Yes            81mg      Take 1           [facility]



     (ECOTRIN)      4-05                               tablet (81           st



     81 MG      14:46:                               mg total)           [facility]



     enteric      48                                 by mouth.           l



     coated                                                        



     tablet                                                        

 

     cranberry      2023-0      Yes                      Take by           [facility]

di



     400 mg      4-05                               mouth.           st



     capsule      14:46:                                              Hospita



               48                                                l

 

     rosuvastati      2023-0      Yes            5mg  QD   Take 1           Meth

tyrese



     n (CRESTOR)      4-05                               tablet (5           st



     5 mg tablet      00:00:                               mg total)           H

ospita



               00                                 by mouth           l



                                                  daily.           

 

     rosuvastati      2023-0      Yes            5mg  QD   Take 1           Meth

tyrese



     n (CRESTOR)      4-05                               tablet (5           st



     5 mg tablet      00:00:                               mg total)           H

ospita



               00                                 by mouth           l



                                                  daily.           

 

     Cranberry      2023-0      Yes                      Take by           Kelse

y



     400 MG oral      2-15                               mouth           [facility]



     Capsule      07:25:                                              -



               37                                                Externa



                                                                 l

 

     Irbesartan-      3-0      Yes       [id number] 1{tbl}      Take 1         

  [facility]



     hydroCHLORO      1-03                               tablet by           [facility]

bold



     thiazide      00:00:                               mouth           -



     150-12.5 MG      00                                 daily           Externa



     oral Tablet                                                        l

 

     Irbesartan-      3-0      Yes       [id number] 1{tbl}      Take 1         

  [facility]



     hydroCHLORO      1-03                               tablet by           [facility]ia

bold



     thiazide      00:00:                               mouth           -



     150-12.5 MG      00                                 daily           Externa



     oral Tablet                                                        l

 

     guaiFENesin      2022      Yes            200mg Q.[id number] Take 10 mL    

       [facility]



     (Tussin)      2-28                          [id number] (200 mg           st



     100 mg/5 mL      13:04:                          3D   total) by           H

ospita



     syrup      17                                 mouth 3           l



                                                  (three)           



                                                  times a           



                                                  day as           



                                                  needed for           



                                                  cough.           

 

     aspirin      2022      Yes            81mg      Take 1           [facility]



     (ECOTRIN)      2-28                               tablet (81           st



     81 MG      13:04:                               mg total)           [facility]ospita



     enteric      17                                 by mouth.           l



     coated                                                        



     tablet                                                        

 

     cranberry      2022      Yes                      Take by           [facility]

di



     400 mg      2-28                               mouth.           st



     capsule      13:04:                                              [facility]ospita



               17                                                l

 

     guaiFENesin      2022      Yes            200mg Q.[id number] Take 10 mL    

       [facility]



     (Tussin)      2-28                          [id number] (200 mg           st



     100 mg/5 mL      13:04:                          3D   total) by           H

ospita



     syrup      17                                 mouth 3           l



                                                  (three)           



                                                  times a           



                                                  day as           



                                                  needed for           



                                                  cough.           

 

     aspirin      2022      Yes            81mg      Take 1           [facility]



     (ECOTRIN)      2-28                               tablet (81           st



     81 MG      13:04:                               mg total)           [facility]ospita



     enteric      17                                 by mouth.           l



     coated                                                        



     tablet                                                        

 

     guaiFENesin      2022      Yes            200mg Q.[id number] Take 10 mL    

       [facility]



     (Tussin)      2-28                          [id number] (200 mg           st



     100 mg/5 mL      13:04:                          3D   total) by           H

ospita



     syrup      17                                 mouth 3           l



                                                  (three)           



                                                  times a           



                                                  day as           



                                                  needed for           



                                                  cough.           

 

     cranberry      2022      Yes                      Take by           [facility]

di



     400 mg      2-28                               mouth.           st



     capsule      13:04:                                              Hospita



               17                                                l

 

     guaiFENesin      2022      Yes            200mg Q.[id number] Take 10 mL    

       [facility]



     (Tussin)      2-                          [id number] (200 mg           st



     100 mg/5 mL      13:04:                          3D   total) by           H

ospita



     syrup      17                                 mouth 3           l



                                                  (three)           



                                                  times a           



                                                  day as           



                                                  needed for           



                                                  cough.           

 

     aspirin      2022      Yes            81mg      Take 1           [facility]



     (ECOTRIN)      2-28                               tablet (81           st



     81 MG      13:04:                               mg total)           [facility]ospita



     enteric      17                                 by mouth.           l



     coated                                                        



     tablet                                                        

 

     cranberry      2022      Yes                      Take by           [facility]

di



     400 mg      2-28                               mouth.           st



     capsule      13:04:                                              Hospita



               17                                                l

 

     guaiFENesin      2022      Yes            200mg Q.[id number] Take 10 mL    

       [facility]



     (Tussin)      2-                          [id number] (200 mg           st



     100 mg/5 mL      13:04:                          3D   total) by           H

ospita



     syrup      17                                 mouth 3           l



                                                  (three)           



                                                  times a           



                                                  day as           



                                                  needed for           



                                                  cough.           

 

     aspirin      2022      Yes            81mg      Take 1           [facility]



     (ECOTRIN)      2-28                               tablet (81           st



     81 MG      13:04:                               mg total)           [facility]ospita



     enteric      17                                 by mouth.           l



     coated                                                        



     tablet                                                        

 

     cranberry      2022      Yes                      Take by           [facility]

di



     400 mg      2-28                               mouth.           st



     capsule      13:04:                                              [facility]ospita



               17                                                l

 

     guaiFENesin      2022      Yes            200mg Q.[id number] Take 10 mL    

       [facility]



     (Tussin)      2-                          [id number] (200 mg           st



     100 mg/5 mL      13:04:                          3D   total) by           H

ospita



     syrup      17                                 mouth 3           l



                                                  (three)           



                                                  times a           



                                                  day as           



                                                  needed for           



                                                  cough.           

 

     aspirin      2022      Yes            81mg      Take 1           [facility]



     (ECOTRIN)      2-28                               tablet (81           st



     81 MG      13:04:                               mg total)           [facility]ospita



     enteric      17                                 by mouth.           l



     coated                                                        



     tablet                                                        

 

     cranberry      2022      Yes                      Take by           [facility]

di



     400 mg      2-28                               mouth.           st



     capsule      13:04:                                              Hospita



               17                                                l

 

     guaiFENesin      2022      Yes            200mg Q.[id number] Take 10 mL    

       [facility]



     (Tussin)      2-                          [id number] (200 mg           st



     100 mg/5 mL      13:04:                          3D   total) by           H

ospita



     syrup      17                                 mouth 3           l



                                                  (three)           



                                                  times a           



                                                  day as           



                                                  needed for           



                                                  cough.           

 

     Doxycycline      2022- No                       TAKE ONE           [facility]

[facility]



     Hyclate 100      -                          CAPSULE           [facility]

old



     MG oral      00:00: 00:00                          DAILY WITH           -



     Capsule      00   :00                           FOOD. WAIT           Extern

a



                                                  2 HOURS           l



                                                  BEFORE           



                                                  LYING DOWN           

 

     Metformin      2022      Yes       [id number] 500mg      Take 1           

[facility]



     HCl 500 MG      1-30                               tablet           [facility]



     oral Tablet      00:00:                               (500 mg           -



               00                                 total) by           [facility]



                                                  mouth in           l



                                                  the            



                                                  morning           



                                                  and 1           



                                                  tablet           



                                                  (500 mg           



                                                  total) in           



                                                  the            



                                                  evening.           



                                                  Take with           



                                                  meals.           

 

     Metformin      2022      Yes       [id number] 500mg      Take 1           

[facility]



     HCl 500 MG      1-30                               tablet           [facility]



     oral Tablet      00:00:                               (500 mg           -



               00                                 total) by           [facility]



                                                  mouth in           l



                                                  the            



                                                  morning           



                                                  and 1           



                                                  tablet           



                                                  (500 mg           



                                                  total) in           



                                                  the            



                                                  evening.           



                                                  Take with           



                                                  meals.           

 

     metFORMIN      2022      Yes                      TAKE 1           [facility]

[facility]



     (GLUCOPHAGE      1-30                               TABLET           [facility]



     ) 500 mg      00:00:                               (500 MG           [facility]

a



     tablet      00                                 TOTAL) BY           l



                                                  MOUTH IN           



                                                  THE            



                                                  MORNING           



                                                  AND 1           



                                                  TABLET IN           



                                                  THE            



                                                  EVENING           



                                                  WITH MEALS           

 

     metFORMIN      2022      Yes                      TAKE 1           [facility]

[facility]



     (GLUCOPHAGE      1-30                               TABLET           st



     ) 500 mg      00:00:                               (500 MG           [facility]

a



     tablet      00                                 TOTAL) BY           l



                                                  MOUTH IN           



                                                  THE            



                                                  MORNING           



                                                  AND 1           



                                                  TABLET IN           



                                                  THE            



                                                  EVENING           



                                                  WITH MEALS           

 

     metFORMIN      2022      Yes                      TAKE 1           [facility]

[facility]



     (GLUCOPHAGE      1-30                               TABLET           st



     ) 500 mg      00:00:                               (500 MG           [facility]

a



     tablet      00                                 TOTAL) BY           l



                                                  MOUTH IN           



                                                  THE            



                                                  MORNING           



                                                  AND 1           



                                                  TABLET IN           



                                                  THE            



                                                  EVENING           



                                                  WITH MEALS           

 

     metFORMIN      2022      Yes                      TAKE 1           [facility]

i



     (GLUCOPHAGE      1-30                               TABLET           st



     ) 500 mg      00:00:                               (500 MG           [facility]

a



     tablet      00                                 TOTAL) BY           l



                                                  MOUTH IN           



                                                  THE            



                                                  MORNING           



                                                  AND 1           



                                                  TABLET IN           



                                                  THE            



                                                  EVENING           



                                                  WITH MEALS           

 

     metFORMIN      2022      Yes                      TAKE 1           [facility]

i



     (GLUCOPHAGE      1-30                               TABLET           st



     ) 500 mg      00:00:                               (500 MG           [facility]

a



     tablet      00                                 TOTAL) BY           l



                                                  MOUTH IN           



                                                  THE            



                                                  MORNING           



                                                  AND 1           



                                                  TABLET IN           



                                                  THE            



                                                  EVENING           



                                                  WITH MEALS           

 

     metFORMIN      2022      Yes                      TAKE 1           [facility]

[facility]



     (GLUCOPHAGE      1-30                               TABLET           st



     ) 500 mg      00:00:                               (500 MG           [facility]

a



     tablet      00                                 TOTAL) BY           l



                                                  MOUTH IN           



                                                  THE            



                                                  MORNING           



                                                  AND 1           



                                                  TABLET IN           



                                                  THE            



                                                  EVENING           



                                                  WITH MEALS           

 

     metFORMIN      2022      Yes                      TAKE 1           [facility]

i



     (GLUCOPHAGE      1-30                               TABLET           st



     ) 500 mg      00:00:                               (500 MG           [facility]

a



     tablet      00                                 TOTAL) BY           l



                                                  MOUTH IN           



                                                  THE            



                                                  MORNING           



                                                  AND 1           



                                                  TABLET IN           



                                                  THE            



                                                  EVENING           



                                                  WITH MEALS           

 

     ASPIRIN 81      2022      Yes            1{tbl}      Take 1           [facility]

[facility]



     OR        1-29                               tablet by           [facility]



               08:11:                               mouth           -



               49                                 daily           [facility]



                                                                 l

 

     ASPIRIN 81      2022      Yes            1{tbl}      Take 1           [facility]

[facility]



     OR        1-29                               tablet by           [facility]



               08:11:                               mouth           -



               49                                 daily           [facility]



                                                                 l

 

     Tresiba      2022      Yes                      INJECT 68           [facility]

di



     FlexTouch      1-26                               UNITS           st



     U-100 100      00:00:                               SUBCUTANEO           [facility]

[facility]



     unit/mL (3      00                                 USLY DAILY           l



     mL)                                          FOR            



     subcutaneou                                         DIABETES           



     s pen                                                        

 

     Tresiba      2022      Yes                      INJECT 68           [facility]

[facility]



     FlexTouch      1-26                               UNITS           st



     U-100 100      00:00:                               SUBCUTANEO           [facility]

[facility]



     unit/mL (3      00                                 USLY DAILY           l



     mL)                                          FOR            



     subcutaneou                                         DIABETES           



     s pen                                                        

 

     Tresiba      2022      Yes                      INJECT 68           [facility]

[facility]



     FlexTouch      1-26                               UNITS           st



     U-100 100      00:00:                               SUBCUTANEO           [facility]

spita



     unit/mL (3      00                                 USLY DAILY           l



     mL)                                          FOR            



     subcutaneou                                         DIABETES           



     s pen                                                        

 

     Tresiba      2022      Yes                      INJECT 68           [facility]

[facility]



     FlexTouch      1-26                               UNITS           st



     U-100 100      00:00:                               SUBCUTANEO           [facility]

spita



     unit/mL (3      00                                 USLY DAILY           l



     mL)                                          FOR            



     subcutaneou                                         DIABETES           



     s pen                                                        

 

     Tresiba      2022      Yes                      INJECT 68           [facility]

[facility]



     FlexTouch      1-26                               UNITS           st



     U-100 100      00:00:                               SUBCUTANEO           Ho

spita



     unit/mL (3      00                                 USLY DAILY           l



     mL)                                          FOR            



     subcutaneou                                         DIABETES           



     s pen                                                        

 

     Tresiba      2022      Yes                      INJECT 68           [facility]

[facility]



     FlexTouch      1-26                               UNITS           st



     U-100 100      00:00:                               SUBCUTANEO           [facility]

spita



     unit/mL (3      00                                 USLY DAILY           l



     mL)                                          FOR            



     subcutaneou                                         DIABETES           



     s pen                                                        

 

     Tresiba      2022      Yes                      INJECT 68           [facility]

[facility]



     FlexTouch      1-26                               UNITS           st



     U-100 100      00:00:                               SUBCUTANEO           [facility]

sp[facility]



     unit/mL (3      00                                 USLY DAILY           l



     mL)                                          FOR            



     subcutaneou                                         DIABETES           



     s pen                                                        

 

     Triamcinolo      2022      Yes                      APPLY THIN           

[facility]



     ne        1-11                               LAYER           [facility]



     Acetonide      00:00:                               AFFECTED           -



     0.1 % apply      00                                 AREAS ON           Exte

rna



     externally                                         BUTTOCKS           l



     Cream                                         FOR 2           



                                                  WEEKS/DENAE           



                                                  H              

 

     Triamcinolo      2022- No                       APPLY THIN          

 [facility]



     ne        -                          LAYER           [facility]



     Acetonide      00:00: 00:00                          AFFECTED           -



     0.1 % apply      00   :00                           AREAS ON           Exte

rna



     externally                                         BUTTOCKS           l



     Cream                                         FOR 2           



                                                  WEEKS/DENAE           



                                                  H              

 

     Nitrofurant      0 - No        [id number] 100mg      Take 1         

  [facility]



     oin Monohyd                                capsule           [facility]

[facility]



     Macro 100      00:00: 00:00                          (100 mg           -



     MG oral      00   :00                           total) by           [facility]



     Capsule                                         mouth 2           l



                                                  times           



                                                  daily           

 

     Insulin            Yes       [id number]           INJECT 68           

[facility]



     Degludec      8-17                               UNITS           [facility]



     (Tresiba      00:00:                               SUBCUTANEO           -



     FlexTouch)      00                                 USLY DAILY           Ext

[facility]



     100 UNIT/ML                                         FOR            l



     subcutaneou                                         DIABETES           



     s Solution                                                        



     Pen-injecto                                                        



     r                                                           

 

     Insulin Pen            Yes       [id number]           USE 1           

[facility]



     Needle      8-17                               NEEDLE VIA           [facility]



     (Novofine      00:00:                               DEVICE           -



     Pen Needle)      00                                 EVERY           [facility]



     32G X 6 MM                                         MORNING           l



     does not                                         FOR THE           



     apply Misc                                         TRESIBA           



                                                  PEN            

 

     OneTouch            Yes       [id number]           Inject 1           

[facility]



     Delica      8-17                               Lancet           [facility]



     Lancets 33G      00:00:                               into the           -



     does not      00                                 skin 2           [facility]



     apply Misc                                         times           l



                                                  daily           

 

     Insulin            Yes       [id number]           INJECT 68           

[facility]



     Degludec      8-17                               UNITS           [facility]



     (Tresiba      00:00:                               SUBCUTANEO           -



     FlexTouch)      00                                 USLY DAILY           Ext

jess[facility]



     100 UNIT/ML                                         FOR            l



     subcutaneou                                         DIABETES           



     s Solution                                                        



     Pen-injecto                                                        



     r                                                           

 

     Insulin Pen            Yes       [id number]           USE 1           

[facility]



     Needle      8-17                               NEEDLE VIA           [facility]



     (Novofine      00:00:                               DEVICE           -



     Pen Needle)      00                                 EVERY           [facility]



     32G X 6 MM                                         MORNING           l



     does not                                         FOR THE           



     apply Misc                                         TRESIBA           



                                                  PEN            

 

     OneTouch            Yes       [id number]           Inject 1           

[facility]



     Delica      8-17                               Lancet           [facility]



     Lancets 33G      00:00:                               into the           -



     does not      00                                 skin 2           [facility]



     apply Misc                                         times           l



                                                  daily           

 

     Insulin            Yes       [id number]           INJECT 68           

[facility]



     Degludec      8-17                               UNITS           [facility]



     (Tresiba      00:00:                               SUBCUTANEO           -



     FlexTouch)      00                                 USLY DAILY           Ext

jess[facility]



     100 UNIT/ML                                         FOR            l



     subcutaneou                                         DIABETES           



     s Solution                                                        



     Pen-injecto                                                        



     r                                                           

 

     Insulin Pen            Yes       [id number]           USE 1           

[facility]



     Needle      8-17                               NEEDLE VIA           [facility]



     (Novofine      00:00:                               DEVICE           -



     Pen Needle)      00                                 EVERY           [facility]



     32G X 6 MM                                         MORNING           l



     does not                                         FOR THE           



     apply Misc                                         TRESIBA           



                                                  PEN            

 

     OneTouch            Yes       [id number]           Inject 1           

[facility]



     Delica      8-17                               Lancet           [facility]



     Lancets 33G      00:00:                               into the           -



     does not      00                                 skin 2           [facility]



     apply Misc                                         times           l



                                                  daily           

 

     Nystatin            Yes                      Apply 1           [facility]



     [id number]      7-14                               applicatio           [facility]



     UNIT/GM      00:00:                               n              -



     apply      00                                 topically           [facility]



     externally                                         2 times           l



     Cream                                         daily           

 

     nystatin      0      Yes                      Apply           [facility]



     (MYCOSTATIN      7-14                               topically.           st



     ) 100,000      00:00:                                              [facility]



     unit/gram      00                                                l



     cream                                                        

 

     nystatin      0      Yes                      Apply           [facility]



     (MYCOSTATIN      7-14                               topically.           st



     ) 100,000      00:00:                                              [facility]



     unit/gram      00                                                l



     cream                                                        

 

     nystatin      -0      Yes                      Apply           [facility]



     (MYCOSTATIN      7-14                               topically.           st



     ) 100,000      00:00:                                              [facility]



     unit/gram      00                                                l



     cream                                                        

 

     nystatin      -0      Yes                      Apply           [facility]



     (MYCOSTATIN      7-14                               topically.           st



     ) 100,000      00:00:                                              [facility]



     unit/gram      00                                                l



     cream                                                        

 

     nystatin      -0      Yes                      Apply           [facility]



     (MYCOSTATIN      7-14                               topically.           st



     ) 100,000      00:00:                                              [facility]



     unit/gram      00                                                l



     cream                                                        

 

     nystatin      -0      Yes                      Apply           [facility]



     (MYCOSTATIN      7-14                               topically.           st



     ) 100,000      00:00:                                              [facility]



     unit/gram      00                                                l



     cream                                                        

 

     nystatin      -0      Yes                      Apply           [facility]



     (MYCOSTATIN      7-14                               topically.           st



     ) 100,000      00:00:                                              [facility]



     unit/gram      00                                                l



     cream                                                        

 

     Nystatin      -0 2023- No                       Apply 1           [facility]

y



     [id number]      7-14 02-21                          applicatio           [facility]

d



     UNIT/GM      00:00: 00:00                          n              -



     apply      00   :00                           topically           [facility]



     externally                                         2 times           l



     Cream                                         daily           

 

     Glucose      0      Yes       [id number]           USE TO           [facility]

y



     Blood      6-07                               CHECK           [facility]



     (OneTouch      00:00:                               GLUCOSE           -



     Ultra) in      00                                 TWICE           [facility]



     vitro Strip                                         DAILY           l

 

     Glucose      -0      Yes       [id number]           USE TO           [facility]

y



     Blood      6-07                               CHECK           [facility]



     (OneTouch      00:00:                               GLUCOSE           -



     Ultra) in      00                                 TWICE           [facility]



     vitro Strip                                         DAILY           l

 

     Glucose      -0      Yes       [id number]           USE TO           [facility]

y



     Blood      6-07                               CHECK           [facility]



     (OneTouch      00:00:                               GLUCOSE           -



     Ultra) in      00                                 TWICE           [facility]



     vitro Strip                                         DAILY           l

 

     ASPIRIN 81      0      Yes            1{tbl}      Take 1           [facility]

[facility]



     OR        4-25                               tablet by           [facility]



               11:09:                               mouth           



               45                                 daily           

 

     Latanoprost      0      Yes            1[drp]      Place 1           [facility]

[facility]



     0.005 %      3-28                               drop into           [facility]



     ophthalmic      00:00:                               both eyes           -



     Solution      00                                 at bedtime           Exter

na



                                                                 l

 

     Latanoprost      0      Yes            1[drp]      Place 1           [facility]

[facility]



     0.005 %      3-28                               drop into           [facility]



     ophthalmic      00:00:                               both eyes           -



     Solution      00                                 at bedtime           Exter

na



                                                                 l

 

     Latanoprost      -0      Yes            1[drp]      Place 1           [facility]

el[facility]



     0.005 %      3-28                               drop into           [facility]



     ophthalmic      00:00:                               both eyes           -



     Solution      00                                 at bedtime           Exter

na



                                                                 l

 

     Latanoprost      -0      Yes            1[drp]      Place 1           [facility]

el[facility]



     0.005 %      3-28                               drop into           [facility]



     ophthalmic      00:00:                               both eyes           



     Solution      00                                 at bedtime           

 

     latanoprost      -0      Yes            1[drp]      Apply 1           M

ethodi



     (XALATAN)      3-28                               drop to           st



     0.005 %      00:00:                               eye.           [facility]



     ophthalmic      00                                                l



     solution                                                        

 

     latanoprost      -0      Yes            1[drp]      Apply 1           M

ethodi



     (XALATAN)      3-28                               drop to           st



     0.005 %      00:00:                               eye.           [facility]



     ophthalmic      00                                                l



     solution                                                        

 

     latanoprost      -0      Yes            1[drp]      Apply 1           M

ethodi



     (XALATAN)      3-28                               drop to           st



     0.005 %      00:00:                               eye.           [facility]



     ophthalmic      00                                                l



     solution                                                        

 

     latanoprost      -0      Yes            1[drp]      Apply 1           M

ethodi



     (XALATAN)      3-28                               drop to           st



     0.005 %      00:00:                               eye.           [facility]



     ophthalmic      00                                                l



     solution                                                        

 

     latanoprost      -0      Yes            1[drp]      Apply 1           M

ethodi



     (XALATAN)      3-28                               drop to           st



     0.005 %      00:00:                               eye.           [facility]



     ophthalmic      00                                                l



     solution                                                        

 

     latanoprost      -0      Yes            1[drp]      Apply 1           M

ethodi



     (XALATAN)      3-28                               drop to           st



     0.005 %      00:00:                               eye.           [facility]



     ophthalmic      00                                                l



     solution                                                        

 

     latanoprost      -0      Yes            1[drp]      Apply 1           M

ethodi



     (XALATAN)      3-28                               drop to           st



     0.005 %      00:00:                               eye.           [facility]



     ophthalmic      00                                                l



     solution                                                        

 

     Insulin Pen      -0      Yes       [id number]           USE 1           

[facility]



     Needle      3-10                               NEEDLE VIA           [facility]



     (Novofine      00:00:                               DEVICE           



     Pen Needle)      00                                 EVERY           



     32G X 6 MM                                         MORNING           



     does not                                         FOR THE           



     apply Misc                                         [facility]           



                                                  PEN            

 

     Irbesartan      -0      Yes            150mg      Take 150           [facility]

[facility]



     150 MG oral      2-03                               mg by           [facility]



     Tablet      00:00:                               mouth           



               00                                 daily for           



                                                  blood           



                                                  pressure           

 

     Irbesartan-      -0      Yes       [id number] 1{tbl}      Take 1         

  [facility]



     hydroCHLORO      2-02                               tablet by           [facility]

[facility]



     thiazide      00:00:                               mouth           -



     150-12.5 MG      00                                 daily           [facility]



     oral Tablet                                                        l

 

     Irbesartan-      2-0      Yes       [id number] 1{tbl}      Take 1         

  [facility]



     hydroCHLORO      2-02                               tablet by           [facility]

[facility]



     thiazide      00:00:                               mouth           



     150-12.5 MG      00                                 daily           



     oral Tablet                                                        

 

     irbesartan-      2-0      Yes            1{tbl} QD   Take 1           Me

thodi



     hydrochloro      2-02                               tablet by           st



     thiazide      00:00:                               mouth           [facility]



     (AVALIDE)      00                                 daily.           l



     150-12.5 mg                                                        



     per tablet                                                        

 

     irbesartan-      2-0      Yes            1{tbl} QD   Take 1           Me

thodi



     hydrochloro      2-02                               tablet by           st



     thiazide      00:00:                               mouth           [facility]



     (AVALIDE)      00                                 daily.           l



     150-12.5 mg                                                        



     per tablet                                                        

 

     irbesartan-      0      Yes            1{tbl} QD   Take 1           Me

thodi



     hydrochloro      2-02                               tablet by           st



     thiazide      00:00:                               mouth           [facility]



     (AVALIDE)      00                                 daily.           l



     150-12.5 mg                                                        



     per tablet                                                        

 

     irbesartan-      -0      Yes            1{tbl} QD   Take 1           Me

thodi



     hydrochloro      2-02                               tablet by           st



     thiazide      00:00:                               mouth           [facility]



     (AVALIDE)      00                                 daily.           l



     150-12.5 mg                                                        



     per tablet                                                        

 

     irbesartan-      0      Yes            1{tbl} QD   Take 1           Me

thodi



     hydrochloro      2-02                               tablet by           st



     thiazide      00:00:                               mouth           [facility]



     (AVALIDE)      00                                 daily.           l



     150-12.5 mg                                                        



     per tablet                                                        

 

     irbesartan-      -0      Yes            1{tbl} QD   Take 1           Me

thodi



     hydrochloro      2-02                               tablet by           st



     thiazide      00:00:                               mouth           [facility]



     (AVALIDE)      00                                 daily.           l



     150-12.5 mg                                                        



     per tablet                                                        

 

     irbesartan-      0      Yes            1{tbl} QD   Take 1           Me

thodi



     hydrochloro      2-02                               tablet by           st



     thiazide      00:00:                               mouth           [facility]



     (AVALIDE)      00                                 daily.           l



     150-12.5 mg                                                        



     per tablet                                                        

 

     Vitamin D,            Yes                                     [facility]



     Ergocalcife      1-28                                              Edward olivas, 1.25      00:00:                                              



     MG (23909      00                                                



     UT) oral                                                        



     Capsule                                                        

 

     ASPIRIN 81            Yes            1{tbl}      Take 1           [facility]

[facility]



     OR        1-26                               tablet by           [facility]



               11:04:                               mouth           



               10                                 daily           

 

     OneTouch            Yes       [id number]           Inject 1           

[facility]



     Delica      1-26                               Lancet           [facility]



     Lancets 33G      00:00:                               into the           



     does not      00                                 skin 2           



     apply Misc                                         times           



                                                  daily           

 

     Irbesartan-      0      Yes       [id number] 1{tbl}      Take 1         

  [facility]



     hydroCHLORO      1-26                               tablet by           [facility]

[facility]



     thiazide      00:00:                               mouth           



     150-12.5 MG      00                                 daily           



     oral Tablet                                                        

 

     Continuous            Yes       [id number]           Use as           

[facility]



     Blood Gluc      1-26                               directed           [facility]

ld



           00:00:                                              



     (FreeStyle      00                                                



     Lilly 14                                                        



     [place])                                                        



     does not                                                        



     apply                                                        



     Device                                                        

 

     Continuous            Yes       [id number]           Use as           

[facility]



     Blood Gluc      1-26                               directed           [facility]

ld



     Sensor      00:00:                                              



     (FreeStyle      00                                                



     Lilly 14                                                        



     Day Sensor)                                                        



     does not                                                        



     apply Misc                                                        

 

     Insulin            Yes       [id number]           INJECT 68           

[facility]



     Degludec      1-26                               UNITS           [facility]



     (Tresiba      00:00:                               SUBCUTANEO           



     FlexTouch)      00                                 USLY DAILY           



     100 UNIT/ML                                         FOR            



     subcutaneou                                         DIABETES           



     s Solution                                                        



     Pen-injecto                                                        



     r                                                           

 

     OneTouch            Yes       [id number]           Inject 1           

[facility]



     Delica      1-26                               Lancet           [facility]



     Lancets 33G      00:00:                               into the           



     does not      00                                 skin 2           



     apply Misc                                         times           



                                                  daily           

 

     Continuous            Yes       [id number]           Use as           

[facility]



     Blood Gluc      1-26                               directed           [facility]

ld



           00:00:                                              



     (FreeStyle      00                                                



     Lilly 14                                                        



     Day [place])                                                        



     does not                                                        



     apply                                                        



     Device                                                        

 

     Continuous            Yes       [id number]           Use as           

[facility]



     Blood Gluc      1-26                               directed           [facility]

ld



     Sensor      00:00:                                              



     (FreeStyle      00                                                



     Lilly 14                                                        



     Day Sensor)                                                        



     does not                                                        



     apply Misc                                                        

 

     Insulin            Yes       [id number]           INJECT 68           

[facility]



     Degludec      1-26                               UNITS           [facility]



     (Tresiba      00:00:                               SUBCUTANEO           



     FlexTouch)      00                                 USLY DAILY           



     100 UNIT/ML                                         FOR            



     subcutaneou                                         DIABETES           



     s Solution                                                        



     Pen-injecto                                                        



     r                                                           

 

     Nitrofurant            Yes       [id number] 100mg      Take 1          

 [facility]



     oin Monohyd      1-19                               capsule           [facility]

[facility]



     Macro 100      00:00:                               (100 mg           



     MG oral      00                                 total) by           



     Capsule                                         mouth 2           



                                                  times           



                                                  daily           

 

     Nitrofurant      0      Yes       [id number] 100mg      Take 1          

 [facility]



     oin Monohyd      1-19                               capsule           [facility]

[facility]



     Macro 100      00:00:                               (100 mg           



     MG oral      00                                 total) by           



     Capsule                                         mouth 2           



                                                  times           



                                                  daily           

 

     ASPIRIN 81      -0      Yes            1{tbl}      Take 1           [facility]

[facility]



     OR        1-17                               tablet by           [facility]



               11:26:                               mouth           



               04                                 daily           

 

     Irbesartan-      2021      Yes                                     [facility]



     hydroCHLORO      1-10                                              [facility]



     thiazide      00:00:                                              



     150-12.5 MG      00                                                



     oral Tablet                                                        

 

     Irbesartan-      2021- No                                      [facility]

y



     hydroCHLORO      1-10 -26                                         [facility]



     thiazide      00:00: 00:00                                         



     150-12.5 MG      00   :00                                          



     oral Tablet                                                        

 

     OneTouch      2021      Yes                                     [facility]



     Delica      0-27                                              [facility]



     Lancets 33G      00:00:                                              



     does not      00                                                



     apply Misc                                                        

 

     OneTouch      -1 2022- No                                      [facility]



     Delica      0-27 01-26                                         [facility]



     Lancets 33G      00:00: 00:00                                         



     does not      00   :00                                          



     apply Misc                                                        

 

     Cholecalcif      0      Yes                                     [facility]



     shadi      8-06                                              [facility]



     (Vitamin      00:00:                                              



     D3) 1.25 MG      00                                                



     (85672 UT)                                                        



     oral                                                        



     Capsule                                                        

 

     Metformin      0      Yes                                     [facility]



     HCl 500 MG      8-06                                              [facility]



     oral Tablet      00:00:                                              



               00                                                

 

     Cholecalcif      0      Yes                                     [facility]



     shadi      8-06                                              [facility]



     (Vitamin      00:00:                                              



     D3) 1.25 MG      00                                                



     (96430 UT)                                                        



     oral                                                        



     Capsule                                                        

 

     Metformin            Yes                                     [facility]



     HCl 500 MG      8-06                                              [facility]



     oral Tablet      00:00:                                              



               00                                                

 

     Cholecalcif      0      Yes                                     [facility]



     shadi      8-06                                              [facility]



     (Vitamin      00:00:                                              



     D3) 1.25 MG      00                                                



     (27828 UT)                                                        



     oral                                                        



     Capsule                                                        

 

     Metformin      0      Yes                                     [facility]



     HCl 500 MG      8-06                                              [facility]



     oral Tablet      00:00:                                              



               00                                                

 

     Metformin      -0 2022- No                                      [facility]



     HCl 500 MG      8-06 11-30                                         [facility]



     oral Tablet      00:00: 00:00                                         -



               00   :00                                          [facility]



                                                                 l

 

     Glucose            Yes                      USE TO           [facility]



     Blood      6-18                               CHECK           [facility]



     (OneTouch      00:00:                               GLUCOSE           



     Ultra) in      00                                 TWICE           



     vitro Strip                                         DAILY           

 

     Glucose            Yes                      USE TO           [facility]



     Blood      6-18                               CHECK           [facility]



     (OneTouch      00:00:                               GLUCOSE           



     Ultra) in      00                                 TWICE           



     vitro Strip                                         DAILY           

 

     Glucose            Yes                      USE TO           [facility]



     Blood      6-18                               CHECK           [facility]



     (OneTouch      00:00:                               GLUCOSE           



     Ultra) in      00                                 TWICE           



     vitro Strip                                         DAILY           

 

     Insulin Pen            Yes                      USE 1           K[facility]

y



     Needle      5-06                               NEEDLE VIA           [facility]



     (Novofine      00:00:                               DEVICE           



     Pen Needle)      00                                 EVERY           



     32G X 6 MM                                         MORNING           



     does not                                         FOR THE           



     apply Misc                                         TRESIBA           



                                                  PEN            

 

     Insulin Pen            Yes                      USE 1           K[facility]

y



     Needle      5-06                               NEEDLE VIA           [facility]



     (Novofine      00:00:                               DEVICE           



     Pen Needle)      00                                 EVERY           



     32G X 6 MM                                         MORNING           



     does not                                         FOR THE           



     apply Misc                                         TRESIBA           



                                                  PEN            

 

     Insulin            Yes                      INJECT 68           [facility]

y



     Degludec      1-01                               UNITS           [facility]



     (Tresiba      00:00:                               SUBCUTANEO           



     FlexTouch)      00                                 USLY DAILY           



     100 UNIT/ML                                         FOR            



     subcutaneou                                         DIABETES           



     s Solution                                                        



     Pen-injecto                                                        



     r                                                           

 

     Insulin      2022- No                       INJECT 68           Tessysreekanth corona



     Degludec      1-01 01-26                          UNITS           [facility]



     (Tresiba      00:00: 00:00                          SUBCUTANEO           



     FlexTouch)      00   :00                           USLY DAILY           



     100 UNIT/ML                                         FOR            



     subcutaneou                                         DIABETES           



     s Solution                                                        



     Pen-injecto                                                        



     r                                                           







Vital Signs







             Vital Name   Observation Time Observation Value Comments     Source

 

             Systolic blood 2023 16:20:00 141 mm[Hg]                [facility]

 [facility] -



             pressure                                            External

 

             Diastolic blood 2023 16:20:00 76 mm[Hg]                 Isabel

y [facility] -



             pressure                                            External

 

             Heart rate   2023 16:20:00 71 /min                   [facility] HAYWARD

ey[facility] -



                                                                 External

 

             Body temperature 2023 16:20:00 36.89 Laura                 Tessy

ey [facility] -



                                                                 External

 

             Respiratory rate 2023 16:20:00 19 /min                   Tessy

ey [facility] -



                                                                 External

 

             Body height  2023 16:20:00 160 cm                    [facility] HAYWARD

eybold -



                                                                 External

 

             Body weight  2023 16:20:00 91.627 kg                 [facility] HAYWARD

eybold -



                                                                 External

 

             BMI          2023 16:20:00 35.78 kg/m2               [facility]

eybold -



                                                                 External

 

             Systolic blood 2023 14:02:00 142 mm[Hg]                [facility]

 [facility] -



             pressure                                            External

 

             Diastolic blood 2023 14:02:00 76 mm[Hg]                 Matthewse

y [facility] -



             pressure                                            External

 

             Heart rate   2023 14:02:00 82 /min                   [facility] HAYWARD

eybold -



                                                                 External

 

             Body temperature 2023 14:02:00 37.06 Laura                 Tessy

ey [facility] -



                                                                 External

 

             Respiratory rate 2023 14:02:00 14 /min                   Tessy corona [facility] -



                                                                 External

 

             Body height  2023 14:02:00 160 cm                    [facility] HAYWARD

eybold -



                                                                 External

 

             Body weight  2023 14:02:00 94.802 kg                 [facility] S

eybold -



                                                                 External

 

             BMI          2023 14:02:00 37.02 kg/m2               [facility] S

eybold -



                                                                 External

 

             Oxygen saturation in 2023 14:02:00 99 /min                   

[facility] -



             Arterial blood by                                        External



             Pulse oximetry                                        

 

             Systolic blood 2022 14:09:00 144 mm[Hg]                [facility]

 [facility] -



             pressure                                            External

 

             Diastolic blood 2022 14:09:00 82 mm[Hg]                 Matthewse

y [facility] -



             pressure                                            External

 

             Heart rate   2022 14:09:00 77 /min                   [facility] S

eybold -



                                                                 External

 

             Body temperature 2022 14:09:00 37 Laura                    Tessy

ey [facility] -



                                                                 External

 

             Respiratory rate 2022 14:09:00 14 /min                   Tessy

ey [facility] -



                                                                 External

 

             Body height  2022 14:09:00 160 cm                    [facility] S

eybold -



                                                                 External

 

             Body weight  2022 14:09:00 93.895 kg                 [facility] S

eybold -



                                                                 External

 

             BMI          2022 14:09:00 36.67 kg/m2               [facility] S

eybold -



                                                                 External

 

             Oxygen saturation in 2022 14:09:00 99 /min                   

[facility] -



             Arterial blood by                                        External



             Pulse oximetry                                        

 

             Systolic blood 2022 16:03:00 142 mm[Hg]                [facility]

 [facility]



             pressure                                            

 

             Diastolic blood 2022 16:03:00 70 mm[Hg]                 Matthewse

y [facility]



             pressure                                            

 

             Heart rate   2022 16:03:00 69 /min                   [facility] S

eybold

 

             Body temperature 2022 16:03:00 36.28 Laura                 Tessy

ey [facility]

 

             Respiratory rate 2022 16:03:00 14 /min                   Tessy

ey [facility]

 

             Body height  2022 16:03:00 160 cm                    [facility] S

eybold

 

             Body weight  2022 16:03:00 92.08 kg                  [facility] S

eybold

 

             BMI          2022 16:03:00 35.96 kg/m2               [facility] S

eybold

 

             Systolic blood 2022 16:56:00 148 mm[Hg]                [facility]

 [facility]



             pressure                                            

 

             Diastolic blood 2022 16:56:00 60 mm[Hg]                 Kelse

y [facility]



             pressure                                            

 

             Heart rate   2022 16:56:00 75 /min                   [facility] S

eybold

 

             Body temperature 2022 16:56:00 36.17 Laura                 Tessy

ey [facility]

 

             Respiratory rate 2022 16:56:00 14 /min                   Tessy

ey [facility]

 

             Body height  2022 16:56:00 160 cm                    [facility] S

eybold

 

             Body weight  2022 16:56:00 92.987 kg                 [facility] S

eybold

 

             BMI          2022 16:56:00 36.31 kg/m2               [facility] S

eybold

 

             Systolic blood 2022 17:16:00 156 mm[Hg]                [facility]

 [facility]



             pressure                                            

 

             Diastolic blood 2022 17:16:00 76 mm[Hg]                 Kelse

y [facility]



             pressure                                            

 

             Heart rate   2022 17:16:00 86 /min                   [facility] S

eybold

 

             Body temperature 2022 17:16:00 36.89 Laura                 Tessy

ey [facility]

 

             Respiratory rate 2022 17:16:00 14 /min                   Tessy

ey [facility]

 

             Body height  2022 17:16:00 160 cm                    [facility] S

eybold

 

             Body weight  2022 17:16:00 93.441 kg                 [facility] HAYWARD

eybold

 

             BMI          2022 17:16:00 36.49 kg/m2               [facility] S

eybold

 

             Systolic blood 2023 20:58:00 132 mm[Hg]                Method

[facility]



             pressure                                            

 

             Diastolic [facility] 2023 20:58:00 82 [facility]                 [facility]

[facility]



             pressure                                            

 

             Heart rate   2023 19:45:00 92 /min                   [facility]

 

             Body height  2023 19:45:00 160 cm                    [facility]

 

             Body weight  2023 19:45:00 92.534 kg                 [facility]

 

             BMI          2023 19:45:00 36.14 kg/m2               [facility]

 

             Oxygen saturation in 2023 19:45:00 95 /min                   

[facility]



             Arterial blood by                                        



             Pulse oximetry                                        

 

             Systolic blood 2022 20:25:00 172 mm[Hg]                [facility]



             [facility]                                            

 

             Diastolic blood 2022 20:25:00 80 mm[Hg]                 [facility]

[facility]



             pressure                                            

 

             BMI          2022 19:01:00 36.14 kg/m2               [facility]

 

             Oxygen saturation in 2022 19:01:00 97 /min                   

[facility]



             Arterial blood by                                        



             Pulse oximetry                                        

 

             Heart rate   2022 19:01:00 68 /min                   [facility]

 

             Body height  2022 19:01:00 160 cm                    [facility]

 

             Body weight  2022 19:01:00 92.534 kg                 [facility]







Procedures







                Procedure       Date / Time Performed Performing Clinician Sour

e

 

                ECG 12[facility]LEAD     [facility]47[facility]

 

                ECG 12-LEAD     2022 18:59:57 [facility]

 

                TTE COMPLETE, WO 2022 18:31:11 [facility]



                CONTRAST, W DOPPLER                                 



                ([id number])                                         







Plan of Care







             Planned Activity Planned Date Details      Comments     Source

 

             Future Scheduled 2023   COVID-19 VACCINE (#1)              [facility]



             Test         22:19:39     [code = COVID-19              



                                       VACCINE (#1)]              

 

             Future Scheduled 2023   SHINGLES VACCINES (1              Met

[facility]



             Test         22:19:39     of 2) [code = SHINGLES              



                                       VACCINES (1 of 2)]              

 

             Future Scheduled 2023   65+ [facility]OCOCCAL              [facility]



             Test         22:19:39     VACCINE (1 - PCV)              



                                       [code = 65+               



                                       PNEUMOCOCCAL VACCINE              



                                       (1 - PCV)]                

 

             Future Scheduled 2023   INFLUENZA VACCINE              Method

[facility]



             Test         22:19:39     [code = INFLUENZA              



                                       VACCINE]                  

 

             Future Scheduled 2023   COVID-19 VACCINE (#1)              [facility]



             Test         16:08:09     [code = COVID-19              



                                       VACCINE (#1)]              

 

             Future Scheduled 2023   SHINGLES VACCINES (1              Met

[facility]



             Test         16:08:09     of 2) [code = SHINGLES              



                                       VACCINES (1 of 2)]              

 

             Future Scheduled 2023   65+ PNEUMOCOCCAL              Methodi

 Hospital



             Test         16:08:09     VACCINE (1 - PCV)              



                                       [code = 65+               



                                       PNEUMOCOCCAL VACCINE              



                                       (1 - PCV)]                

 

             Future Scheduled 2023   INFLUENZA VACCINE              [facility]

[facility]



             Test         16:08:09     [code = INFLUENZA              



                                       VACCINE]                  

 

             Future Scheduled 2023   COVID-19 VACCINE (#1)              [facility]



             Test         08:32:19     [code = COVID-19              



                                       VACCINE (#1)]              

 

             Future Scheduled 2023   SHINGLES VACCINES (1              Met

[facility]



             Test         08:32:19     of 2) [code = SHINGLES              



                                       VACCINES (1 of 2)]              

 

             Future Scheduled 2023   65+ PNEUMOCOCCAL              Methodi

 Hospital



             Test         08:32:19     VACCINE (1 - PCV)              



                                       [code = 65+               



                                       PNEUMOCOCCAL VACCINE              



                                       (1 - PCV)]                

 

             Future Scheduled 2023   INFLUENZA VACCINE              Method

[facility]



             Test         08:32:19     [code = INFLUENZA              



                                       VACCINE]                  

 

             Future Scheduled 2023   COVID-19 VACCINE (#1)              [facility]



             Test         10:44:14     [code = COVID-19              



                                       VACCINE (#1)]              

 

             Future Scheduled 2023   SHINGLES VACCINES (1              Met

[facility]



             Test         10:44:14     of 2) [code = SHINGLES              



                                       VACCINES (1 of 2)]              

 

             Future Scheduled 2023   65+ PNEUMOCOCCAL              Methodi

 Hospital



             Test         10:44:14     VACCINE (1 - PCV)              



                                       [code = 65+               



                                       PNEUMOCOCCAL VACCINE              



                                       (1 - PCV)]                

 

             Future Scheduled 2023   INFLUENZA VACCINE              Method

[facility]



             Test         10:44:14     [code = INFLUENZA              



                                       VACCINE]                  

 

             Future Scheduled 2023   COVID-19 VACCINE (#1)              [facility]



             Test         21:52:54     [code = COVID-19              



                                       VACCINE (#1)]              

 

             Future Scheduled 2023   SHINGLES VACCINES (1              Met

[facility]



             Test         21:52:54     of 2) [code = SHINGLES              



                                       VACCINES (1 of 2)]              

 

             Future Scheduled 2023   65+ PNEUMOCOCCAL              Methodi

 Hospital



             Test         21:52:54     VACCINE (1 - PCV)              



                                       [code = 65+               



                                       PNEUMOCOCCAL VACCINE              



                                       (1 - PCV)]                

 

             Future Scheduled 2023   INFLUENZA VACCINE              Method

[facility]



             Test         21:52:54     [code = INFLUENZA              



                                       VACCINE]                  

 

             Future Scheduled 2023   COVID-19 VACCINE (#1)              [facility]



             Test         21:52:54     [code = COVID-19              



                                       VACCINE (#1)]              

 

             Future Scheduled 2023   SHINGLES VACCINES (1              Met

[facility]



             Test         21:52:54     of 2) [code = SHINGLES              



                                       VACCINES (1 of 2)]              

 

             Future Scheduled 2023   65+ PNEUMOCOCCAL              Methodi

 Hospital



             Test         21:52:54     VACCINE (1 - PCV)              



                                       [code = 65+               



                                       PNEUMOCOCCAL VACCINE              



                                       (1 - PCV)]                

 

             Future Scheduled 2023   INFLUENZA VACCINE              Method

[facility]



             Test         21:52:54     [code = INFLUENZA              



                                       VACCINE]                  

 

             Future Scheduled 2023   COVID-19 VACCINE (#1)              [facility]



             Test         21:52:54     [code = COVID-19              



                                       VACCINE (#1)]              

 

             Future Scheduled 2023   SHINGLES VACCINES (1              Met

[facility]



             Test         21:52:54     of 2) [code = SHINGLES              



                                       VACCINES (1 of 2)]              

 

             Future Scheduled 2023   65+ PNEUMOCOCCAL              Methodi

 Hospital



             Test         21:52:54     VACCINE (1 - PCV)              



                                       [code = 65+               



                                       PNEUMOCOCCAL VACCINE              



                                       (1 - PCV)]                

 

             Future Scheduled 2023   INFLUENZA VACCINE              Method

[facility]



             Test         21:52:54     [code = INFLUENZA              



                                       VACCINE]                  







Encounters







        Start   End     Encounter Admission Attending Care    Care    Encounter 

Source



        Date/Time Date/Time Type    Type    Clinicians Facility Department ID   

   

 

        2023 Outpatient         MINOO PARIKH  [id number]

87 [facility]



        13:00:00 13:00:00                 ONIEL Subramanianybol

jabier

 

        2023 Outpatient         MINOO PARIKH  [id number]

18 [facility]



        13:00:00 13:00:00                 ONEIL                           Seybol

jabier

 

        2023 Outpatient         MINOO PARIKH  [id number]

65 [facility]



        08:30:00 08:30:00                 ONIEL Subramanianybol

jabier

 

        2023 Outpatient         LAB90   MINOO MAR  [id number]

72 [facility]



        12:00:00 12:00:00                                                 Deandreol

jabier

 

        2023 Outpatient         PREZAS, MINOO MAR  [id number]

17 [facility]



        11:30:00 11:30:00                 ANDERS                          Seybol

d

 

        2023 Outpatient         EDELMIRA [facility]OO MAR  [id number]

838 [facility]



        00:00:00 00:00:00                 TING                           Seybol

d

 

        2023 Outpatient         ERIBERTO MINOO MAR  [id number]

[id number] [facility]



        00:00:00 00:00:00                 MD JOLYNN                         Seybol

d

 

        2023 Outpatient         DESTINYMINOO HARTMAN  [id number]

31 [facility]



        00:00:00 00:00:00                 ONIEL                           Seybol

d

 

        2023 Office          Fozia,  1.2.840.1 [id number]

0579 [facility]



        14:00:00 16:00:45 Visit           Dwaynekyle Carvalho [id number]         [address]     

st



                                                3.430.2.7                 Hospit

a



                                                .[id number]                 l



                                                .8                      

 

        2023 Office          Fozia,  1.2.840.1 [id number]

0579 [facility]



        14:00:00 16:00:45 Visit           Dwaynekyle Carvalho [id number]         176     

st



                                                3.430.2.7                 Hospit

a



                                                .[id number]                 l



                                                .8                      

 

        2023 Travel                  1.2.840.1 [id number]

[id number] [facility]



        00:00:00 00:00:00                         86909.1.1 [id number] 226     

st



                                                3.430.2.7 [id number]         Ho

spita



                                                .[id number] 084.8           l



                                                .8                      

 

        2023 Travel                  1.2.840.1 [id number]

[id number] [facility]i



        00:00:00 00:00:00                         72097.1.1 [id number] 226     

st



                                                3.430.2.7 0.2.7.3.698         Ho

spita



                                                .[id number] 084.8           l



                                                .8                      

 

        2023 Outpatient         LAB90   MINOO MAR  [id number]

92 [facility]



        08:15:00 08:15:00                                                 Seybol

d

 

        2023 Outpatient         MINOO HICKEY  [id number]

675 [facility]



        00:00:00 00:00:00                 TING                           Seybol

d

 

        2023 Outpatient         MINOO HICKEY  [id number]

[address]



        08:00:00 08:00:00                 TING                           Seybol

d

 

        2023 Outpatient         MINOO HICKEY  [id number]

[address]



        00:00:00 00:00:00                 TING                           Seybol

d

 

        2022 Outpatient         MINOO HICKEY  [id number]

[address]



        00:00:00 00:00:00                 TING                           Seybol

d

 

        2022 Outpatient         MINOO HICKEY  [id number]

[address]



        00:00:00 00:00:00                 TING                           Seybol

d

 

        2022 Office          Abad,  1.2.840.1 [id number]

2971 [address]



        13:00:00 14:33:45 Visit           Dwaynekyle Carvalho 34764.1.1         [address]     

st



                                                3.430.2.7                 Hospit

a



                                                .3.625135                 l



                                                .8                      

 

        2022 Office          Abad,  1.2.840.1 [id number]

2971 [facility]i



        13:00:00 14:33:45 Visit           Dwaynekyle Carvalho 74996.1.1         114     

st



                                                3.430.2.7                 Hospit

a



                                                .3.034420                 l



                                                .8                      

 

        2022 Travel                  1.2.840.1 1.2.039.431 0433

[id number] Methodi



        00:00:00 00:00:00                         73378.1.1 350.1.13.43 862     

st



                                                3.430.2.7 0.2.7.3.698         Ho

spita



                                                .3.396417 084.8           l



                                                .8                      

 

        2022 Outpatient         FOZIA  [facility]     [id number]

[address]



        00:00:00 00:00:00                 DWAYNE                   356     Method

i



                                                                        st

 

        2022 Travel                  1.2.840.1 1.2.282.445 5698

[id number] Methodi



        00:00:00 00:00:00                         42450.1.1 350.1.13.43 862     

st



                                                3.430.2.7 0.2.7.3.698         Ho

spita



                                                .3.384695 084.8           l



                                                .8                      

 

        2022 Outpatient         MINOO HICKEY  [id number]

[address]



        00:00:00 00:00:00                 TING                           Seybol

d

 

        2022 Orders          Walter-Brigida 1.2.840.1 [id number]

[id number] Methodi



        00:00:00 00:00:00 Only            an, Elyssa 93647.1.1         [address]     [address]



                                        Chanel  3.430.2.7                 Hospit

a



                                                .3.962427                 l



                                                .8                      

 

        2022 Orders          Walter-Brigida 1.2.840.1 [id number]

[id number] Methodi



        00:00:00 00:00:00 Only            an, Elyssa 35785.1.1         150     st



                                        Chanel  3.430.2.7                 Hospit

a



                                                .3.794009                 l



                                                .8                      

 

        2022 Outpatient         MINOO HICKEY  [id number]

[address]



        00:00:00 00:00:00                 TING                           Seybol

d

 

        2022 Outpatient         MINOO HICKEY  [id number]

[address]



        00:00:00 00:00:00                 TING                           Seybol

d

 

        2022 Outpatient         LAB90   MINOO MAR  [id number]

[address]



        09:10:00 09:10:00                                                 Seybol

d

 

        2022 Outpatient         MINOO HICKEY  [id number]

[address]



        08:15:00 08:15:00                 TING                           Seybol

d

 

        2022 Outpatient         MINOO FOSTER  [id number]

[address]



        10:45:00 10:45:00                 ANDERS                          Seybol

d

 

        2022 Outpatient         MINOO HICKEY  [id number]

[address]



        08:15:00 08:15:00                 TING                           Seybol

d

 

        2022 Outpatient         MINOO HICKEY  [id number]

[address]



        00:00:00 00:00:00                 TING                           Seybol

d

 

        2022 Outpatient         LAB90   MINOO MAR  [id number]

[address]



        08:05:00 08:05:00                                                 Seybol

d

 

        2022 Outpatient         MINOO HICKEY  [id number]

[address]



        00:00:00 00:00:00                 TING                           Seybol

d

 

        2022 Outpatient         LAB90   MINOO MAR  [id number]

[address]



        11:00:00 11:00:00                                                 Seybol

d

 

        2022 Office          Nazario Hickey    1.2.840.114 [id number]

[address]



        10:00:00 10:45:00 Visit           Ting   Lucas 350.1.13.13         Se

ybold



                                        Somogyi         1.2.7.2.686         



                                                        [phone]         



                                                        0               

 

        2022 Outpatient         MINOO HICKEY  [id number]

[address]



        10:45:00 10:45:00                 TING                           Seybol

d

 

        2022 Office          Nazario Hickey    1.2.840.114 [id number]

[address]



        10:30:00 10:45:00 Visit           Ting Zavala 350.1.13.13         Se

ybold



                                        Somogyi         1.2.7.2.686         



                                                        [phone]         



                                                        0               

 

        2022 Outpatient         MINOO HICKEY  [id number]

[address]



        13:30:00 13:30:00                 TING                           Seybol

d

 

        2022 Outpatient         MINOO HICKEY  [id number]

[address]



        15:00:00 15:00:00                 TING                           Seybol

d

 

        2022 Office          Edelmira Nazario    1.2.840.114 [id number]

[address]



        10:30:00 11:00:00 Visit           Ting Zavala 350.1.13.13         Se

ybold



                                        Somogyi         1.2.7.2.686         



                                                        [phone]         



                                                        0               

 

        2022 Outpatient         MINOO HICKEY  [id number]

[address]



        00:00:00 00:00:00                 TING                           Seybol

d

 

        2022 Outpatient         MINOO HICKEY  [id number]

[address]



        10:30:00 10:30:00                 TING                           Seybol

d

 

        2022 Outpatient         MINOO HICKEY  [id number]

[address]



        00:00:00 00:00:00                 TING                           Seybol

d

 

        2022 Outpatient         MINOO HICKEY  [id number]

[address]



        00:00:00 00:00:00                 TING                           Seybol

d

 

        2022 Outpatient         LAB90   MINOO MAR  [id number]

[address]



        11:40:00 11:40:00                                                 Seybol

d

 

        2022 Office          Nazario Hickey    1.2.840.114 [id number]

[address]



        11:00:00 11:15:00 Visit           Ting Zavala 350.1.13.13         Se

ybold



                                        Somogyi         1.2.7.2.686         



                                                        [phone]         



                                                        0               

 

        2022-03-10 2022-03-10 Outpatient         MINOO HICKEY  [id number]

[address]



        00:00:00 00:00:00                 TING                           Seybol

d

 

        2022 Outpatient         MINOO HICKEY  [id number]

[address]



        00:00:00 00:00:00                 TING                           Seybol

d

 

        2022 Outpatient         MINOO HICKEY  [id number]

[address]



        00:00:00 00:00:00                 TING                           Seybol

d

 

        2022 Outpatient         EDELMIRA MINOO MAR  [id number]

[address]



        00:00:00 00:00:00                 TINGTRINI Subramanianybol

d

 

        2022 Office          Nazario Hickey    1.2.840.114 [id number]

[address]



        11:00:00 11:30:00 Visit           Ting Zavala 350.1.13.13         Se

oleg



                                        Judyyi         1.2.7.2.686         



                                                        [phone]         



                                                        0               

 

        2022 Outpatient         EDELMIRAMINOO  [id number]

[address]



        11:15:00 11:15:00                 TING                           Seybol

d

 

        2022 Outpatient         MINOO HICKEY  [id number]

[address]



        11:00:00 11:00:00                 TING Subramanianybol

d

 

        2022 Outpatient         LAB90   MINOO MAR  [id number]

[address]



        12:00:00 12:00:00                                                 Seybol

d

 

        2022 Office          Nazario Hickey    1.2.840.114 [id number]

[address]



        11:15:00 11:45:00 Visit           Ting Zavala 350.1.13.13         Se

oleg Somersjackyi         1.2.7.2.686         



                                                        [phone]         



                                                        0               

 

        2022 Outpatient         WATERS_S [facility]    [id number] [place]



        10:47:00 10:47:00                                         0114    [facility]



                                                                        Hospita



                                                                        l



                                                                        Clinics







Results







           Test Description Test Time  Test Comments Results    Result Comments 

Source









                    ECG 12 lead         2023 12:36:11 









                      Test Item  Value      Reference Range Interpretation Comme

nts









             Ventricular rate (test code = 253) 85                              

       

 

             Atrial rate (test code = 255) 85                                   

  

 

             SC interval (test code = 266) 200                                  

  

 

             QRSD interval (test code = 260) 80                                 

    

 

             QT interval (test code = 264) 380                                  

  

 

             QTC interval (test code = 265) 452                                 

   

 

             P axis 1 (test code = 267) 76                                     

 

             QRS axis 1 (test code = 268) 2                                     

 

 

             T wave axis (test code = 270) 18                                   

  

 

             EKG impression (test code = 273) Normal sinus rhythm-Cannot rule ou

t Anterior                           



                          infarct (cited on or before                           



                          28-DEC-2022)-Abnormal ECG-In automated                

           



                          comparison with ECG of 28-DEC-2022                    

       



                          12:59,-Nonspecific T wave abnormality has             

              



                          replaced inverted T waves in Inferior                 

          



                          leads-Electronically Signed By Fozia THOMAS              

             



                          Dwayne KShanel () on 2023 7:36:03 AM                

           



[address]2023-04-06 12:36:11





             Test Item    Value        Reference Range Interpretation Comments

 

             Ventricular rate (test 85                                     



             code = 253)                                         

 

             Atrial rate (test code 85                                     



             = 255)                                              

 

             SC interval (test code 200                                    



             = 266)                                              

 

             QRSD interval (test 80                                     



             code = 260)                                         

 

             QT interval (test code 380                                    



             = 264)                                              

 

             QTC interval (test code 452                                    



             = 265)                                              

 

             P axis 1 (test code = 76                                     



             267)                                                

 

             QRS axis 1 (test code = 2                                      



             268)                                                

 

             T wave axis (test code 18                                     



             = 270)                                              

 

             EKG impression (test Normal sinus                           



             code = 273)  rhythm-Cannot rule out                           



                          Anterior infarct                           



                          (cited on or before                           



                          28-DEC-2022)-Abnormal                           



                          ECG-In automated                           



                          comparison with ECG of                           



                          28-DEC-2022                            



                          12:59,-Nonspecific T                           



                          wave abnormality has                           



                          replaced inverted T                           



                          waves in Inferior                           



                          leads-Electronically                           



                          Signed By Fozia THOMAS                           



                          Dwayne K. () on                           



                          2023 7:36:03 AM                           



[address]2022-12-31 00:06:08





             Test Item    Value        Reference Range Interpretation Comments

 

             Ventricular rate (test                                        



             code = 253)                                         

 

             Atrial rate (test code =                                        



             255)                                                

 

             SC interval (test code =                                        



             266)                                                

 

             QRSD interval (test code                                        



             = 260)                                              

 

             QT interval (test code =                                        



             264)                                                

 

             QTC interval (test code                                        



             = 265)                                              

 

             P axis 1 (test code =                                        



             267)                                                

 

             QRS axis 1 (test code =                                        



             268)                                                

 

             T wave axis (test code =                                        



             270)                                                

 

             EKG impression (test Normal sinus                           



             code = 273)  rhythm-Septal infarct                           



                          , age                                  



                          undetermined-Abnormal                           



                          ECG-No previous ECGs                           



                          available-[employer]                           



                          lly Signed By Fozia THOMAS Dwayne KShanel ()                           



                          on 2022 6:06:04                           



                          PM                                     



[address]2022-12-31 00:06:08





             Test Item    Value        Reference Range Interpretation Comments

 

             Ventricular rate (test                                        



             code = 253)                                         

 

             Atrial rate (test code =                                        



             255)                                                

 

             SC interval (test code =                                        



             266)                                                

 

             QRSD interval (test code                                        



             = 260)                                              

 

             QT interval (test code =                                        



             264)                                                

 

             QTC interval (test code                                        



             = 265)                                              

 

             P axis 1 (test code =                                        



             267)                                                

 

             QRS axis 1 (test code =                                        



             268)                                                

 

             T wave axis (test code =                                        



             270)                                                

 

             EKG impression (test Normal sinus                           



             code = 273)  rhythm-Septal infarct                           



                          , age                                  



                          undetermined-Abnormal                           



                          ECG-No previous ECGs                           



                          available-Electronic[employer]                           



                          lly Signed By Fozia THOMAS Dwaynekyle PAVON ()                           



                          on 2022 6:06:04                           



                          PM                                     



[address]2022-12-31 00:06:08





             Test Item    Value        Reference Range Interpretation Comments

 

             Ventricular rate (test                                        



             code = 253)                                         

 

             Atrial rate (test code =                                        



             255)                                                

 

             SC interval (test code =                                        



             266)                                                

 

             QRSD interval (test code                                        



             = 260)                                              

 

             QT interval (test code =                                        



             264)                                                

 

             QTC interval (test code                                        



             = 265)                                              

 

             P axis 1 (test code =                                        



             267)                                                

 

             QRS axis 1 (test code =                                        



             268)                                                

 

             T wave axis (test code =                                        



             270)                                                

 

             EKG impression (test Normal sinus                           



             code = 273)  rhythm-Septal infarct                           



                          , age                                  



                          undetermined-Abnormal                           



                          ECG-No previous ECGs                           



                          available-Electronica                           



                          lly Signed By Dwayne Abad MD ()                           



                          on 2022 6:06:04                           



                          PM                                     



[address]2022-12-31 00:06:08





             Test Item    Value        Reference Range Interpretation Comments

 

             Ventricular rate (test                                        



             code = 253)                                         

 

             Atrial rate (test code =                                        



             255)                                                

 

             SC interval (test code =                                        



             266)                                                

 

             QRSD interval (test code                                        



             = 260)                                              

 

             QT interval (test code =                                        



             264)                                                

 

             QTC interval (test code                                        



             = 265)                                              

 

             P axis 1 (test code =                                        



             267)                                                

 

             QRS axis 1 (test code =                                        



             268)                                                

 

             T wave axis (test code =                                        



             270)                                                

 

             EKG impression (test Normal sinus                           



             code = 273)  rhythm-Septal infarct                           



                          , age                                  



                          undetermined-Abnormal                           



                          ECG-No previous ECGs                           



                          available-Electronic[employer]                           



                          lly Signed By Fozia THOMAS Dwaynekyle PAVON ()                           



                          on 2022 6:06:04                           



                          PM                                     



[address]2022-12-31 00:06:08





             Test Item    Value        Reference Range Interpretation Comments

 

             Ventricular rate (test                                        



             code = 253)                                         

 

             Atrial rate (test code =                                        



             255)                                                

 

             SC interval (test code =                                        



             266)                                                

 

             QRSD interval (test code                                        



             = 260)                                              

 

             QT interval (test code =                                        



             264)                                                

 

             QTC interval (test code                                        



             = 265)                                              

 

             P axis 1 (test code =                                        



             267)                                                

 

             QRS axis 1 (test code =                                        



             268)                                                

 

             T wave axis (test code =                                        



             270)                                                

 

             EKG impression (test Normal sinus                           



             code = 273)  rhythm-Septal infarct                           



                          , age                                  



                          undetermined-Abnormal                           



                          ECG-No previous ECGs                           



                          available-[employer]                           



                          lly Signed By Fozia THOMAS, Dwayne PAVON (2008)                           



                          on 2022 6:06:04                           



                          PM                                     



[facility]

## 2023-05-23 NOTE — RAD REPORT
EXAM DESCRIPTION:  RAD - Abdomen 1 View (KUB) - 5/23/2023 12:19 pm

 

CLINICAL HISTORY:  FLANK PAIN

 

COMPARISON:  [facility] dated 5/22/2023

 

TECHNIQUE:   Single AP view of the abdomen.

 

FINDINGS:  Nonobstructive bowel gas pattern. No air-fluid levels, free air, or pneumatosis. Relative 
paucity of bowel gas throughout the abdomen, a nonspecific finding. Scattered pelvic phleboliths, oth
erwise no suspicious calcifications projecting over the renal fossa bilaterally.

 

No significant bony abnormality.

 

IMPRESSION:  No evidence of obstruction. Relative paucity of bowel gas, a nonspecific finding.

## 2023-05-23 NOTE — ER
Nurse's Notes                                                                                     

 [facility]                                                                 

Name: Rosalind Hernandez                                                                            

Age: 82 yrs                                                                                       

Sex: Female                                                                                       

: 1940                                                                                   

MRN: [id number]                                                                                   

Arrival Date: 2023                                                                          

Time: 10:10                                                                                       

Account#: [id number]                                                                            

Bed 5                                                                                             

Private MD: Ting Reza                                                                        

Diagnosis: Low back pain                                                                          

                                                                                                  

Presentation:                                                                                     

                                                                                             

10:26 Chief complaint: Patient states: here yesterday and they told me it wasn;t my kidney ,  iw  

      they gave her pain meds but pain is worse across lower back. Coronavirus screen: At         

      this time, the client does not indicate any symptoms associated with coronavirus-19.        

      Ebola Screen: Patient negative for fever greater than or equal to 101.5 degrees             

      Fahrenheit, and additional compatible Ebola Virus Disease symptoms Patient denies           

      exposure to infectious person. Patient denies travel to an Ebola-affected area in the       

      21 days before illness onset. No symptoms or risks identified at this time. Initial         

      Sepsis Screen: Does the patient meet any 2 criteria? No. Patient's initial sepsis           

      screen is negative. Does the patient have a suspected source of infection? No.              

      Patient's initial sepsis screen is negative. Risk Assessment: Do you want to hurt           

      yourself or someone else? Patient reports no desire to harm self or others. Onset of        

      symptoms was May 10, 2023.                                                                  

10:26 Method Of Arrival: Ambulatory                                                           iw  

10:26 Acuity: CESIA 3                                                                           iw  

                                                                                                  

Historical:                                                                                       

- Allergies:                                                                                      

10:28 No Known Allergies;                                                                     iw  

- PMHx:                                                                                           

10:28 Diabetes - IDDM; Hypertension;                                                          iw  

                                                                                                  

- Immunization history:: Adult Immunizations up to date.                                          

- Social history:: Smoking status: Patient denies any tobacco usage or history of.                

                                                                                                  

                                                                                                  

Screening:                                                                                        

10:50 [facility] ED Fall Risk Assessment (Adult) Impaired Gait Yes (1 pt) Mobility Assist       ll1 

      Device Used Yes (1 pt) Score/Fall Risk Level 0 - 2 = Low Risk Oriented to surroundings,     

      Maintained a safe environment, Educated pt \T\ family on fall prevention, incl call for     

      assistance when getting out of bed, Hourly rounding (assess needs \T\ fall precautionary    

      measures) done. Abuse screen: Denies threats or abuse. Nutritional screening: No            

      deficits noted. Tuberculosis screening: No symptoms or risk factors identified.             

                                                                                                  

Assessment:                                                                                       

10:35 General: Appears in no apparent distress. Behavior is calm, cooperative, appropriate    ll1 

      for age. Pain: Complains of pain in right low back Quality of pain is described as          

      aching. Musculoskeletal: Circulation, motion, and sensation intact. Capillary refill <      

      3 seconds.                                                                                  

10:56 Reassessment: No changes from previously documented assessment. Gait steady to restroom.ll1 

11:20 Reassessment: No changes from previously documented assessment. UA sent to [facility].         ll1 

12:15 Reassessment: No changes from previously documented assessment. Patient and/or family   ll1 

      updated on plan of care and expected duration. Pain level reassessed. pulled up in bed.     

13:02 Reassessment: No changes from previously documented assessment. Patient and/or family   ll1 

      updated on plan of care and expected duration. Pain level reassessed. Patient is alert,     

      oriented x 3, equal unlabored respirations, skin warm/dry/pink.                             

                                                                                                  

Vital Signs:                                                                                      

10:26  / 74; Pulse 75; Resp 16; Temp 98.4; Pulse Ox 97% on R/A; Weight 91.63 kg; Height iw  

      5 ft. 3 in. ; Pain 10/10;                                                                   

13:02  / 88; Pulse 70; Resp 18; Pulse Ox 95% ;                                          ll1 

10:26 Body Mass Index 35.78 (91.63 kg, 160.02 cm)                                             iw  

10:26 Pain Scale: Adult                                                                       iw  

                                                                                                  

ED Course:                                                                                        

10:12 Patient arrived in ED.                                                                  am2 

10:12 Ting Reza is Private Physician.                                                    am2 

10[facility]14 [facility]                                                          jh7 

10:14 Tessie Webster MD is Attending Physician.                                                jh7 

10:28 Triage completed.                                                                       iw  

10:28 Arm band placed on.                                                                     iw  

10:31 Moreno Fernandez, RN is Primary Nurse.                                                     ll1 

10:35 Inserted saline lock: 22 gauge in right forearm, using aseptic technique. Blood         ll1 

      collected.                                                                                  

12:21 XRAY KUB In Process Unspecified.                                                        EDMS

12:42 Ting Reza is Referral Physician.                                                   jh7 

13:02 IV discontinued, intact, bleeding controlled, No redness/swelling at site. Pressure     ll1 

      dressing applied.                                                                           

13:02 No provider procedures requiring assistance completed.                                  ll1 

13:03 Patient has correct armband on for positive identification. Bed in low position. Call   ll1 

      light in reach. Side rails up X2. Client placed on continuous cardiac and pulse             

      oximetry monitoring. NIBP monitoring applied.                                               

                                                                                                  

Administered Medications:                                                                         

10:44 Drug: TORadol - Ketorolac IVP 15 mg Route: IVP; Site: right forearm;                    1 

13:03 Follow up: Response: No adverse reaction                                                1 

                                                                                                  

                                                                                                  

Medication:                                                                                       

10:50 VIS not applicable for this client.                                                     1 

                                                                                                  

Outcome:                                                                                          

12:42 Discharge ordered by MD.                                                                ray 

13:02 Discharged to home ambulatory.                                                          1 

13:02 Condition: stable                                                                           

13:02 Discharge instructions given to patient, family, Instructed on discharge instructions,      

      follow up and referral plans. medication usage, Demonstrated understanding of               

      instructions, follow-up care, medications, Prescriptions given X 1.                         

13:06 Patient left the ED.                                                                    1 

                                                                                                  

Signatures:                                                                                       

Dispatcher [employer]                           EDRenea Foreman, RN                     Zaina Mcbride Lynsay, RN                       RN   ll1                                                  

Yeimi Smith, FNP                   FNP  7                                                  

                                                                                                  

**************************************************************************************************

## 2023-05-23 NOTE — EDPHYS
Physician Documentation                                                                           

 [facility]                                                                 

Name: Rosalind Hernandez                                                                            

Age: 82 yrs                                                                                       

Sex: Female                                                                                       

: 1940                                                                                   

MRN: [id number]                                                                                   

Arrival Date: 2023                                                                          

Time: 10:10                                                                                       

Account#: [id number]                                                                            

Bed 5                                                                                             

Private MD: Ting Reza ED Physician Tessie Webster                                                                         

HPI:                                                                                              

                                                                                             

10:15 This 82 yrs old Female presents to ER via Unassigned with complaints of Hip Pain, Low   jh7 

      Back Pain.                                                                                  

10:15 82-year-old female presents with right flank pain radiating across back that started    jh7 

      this morning. The patient reports urinary frequency for the past few years. She reports     

      that they called Dr. Villanueva this morning who advised to go to the ER to get her kidney     

      function tested. Patient denies dysuria or fever. History of diabetes and hypertension..    

                                                                                                  

Historical:                                                                                       

- Allergies:                                                                                      

10:28 No Known Allergies;                                                                     iw  

- PMHx:                                                                                           

10:28 Diabetes - IDDM; Hypertension;                                                          iw  

                                                                                                  

- Immunization history:: Adult Immunizations up to date.                                          

- Social history:: Smoking status: Patient denies any tobacco usage or history of.                

                                                                                                  

                                                                                                  

ROS:                                                                                              

10:15 Constitutional: Negative for fever, chills, and weight loss, Eyes: Negative for injury, jh7 

      pain, redness, and discharge, ENT: Negative for injury, pain, and discharge, Neck:          

      Negative for injury, pain, and swelling, Cardiovascular: Negative for chest pain,           

      palpitations, and edema, Respiratory: Negative for shortness of breath, cough,              

      wheezing, and pleuritic chest pain, Abdomen/GI: Negative for abdominal pain, nausea,        

      vomiting, diarrhea, and constipation, MS/Extremity: Negative for injury and deformity,      

      Skin: Negative for injury, rash, and discoloration, Neuro: Negative for headache,           

      weakness, numbness, tingling, and seizure.                                                  

10:15 Back: Positive for pain at rest, flank pain, on the right.                                  

10:15 : Positive for flank pain, urinary frequency.                                             

10:15 All other systems are negative.                                                             

                                                                                                  

Exam:                                                                                             

10:15 Constitutional:  This is a well developed, well nourished patient who is awake, alert,  jh7 

      and in no acute distress. Head/Face:  Normocephalic, atraumatic. Cardiovascular:            

      Regular rate and rhythm with a normal S1 and S2.  No gallops, murmurs, or rubs.  Normal     

      PMI, no JVD.  No pulse deficits. Respiratory:  Lungs have equal breath sounds               

      bilaterally, clear to auscultation and percussion.  No rales, rhonchi or wheezes noted.     

       No increased work of breathing, no retractions or nasal flaring. Skin:  Warm, dry with     

      normal turgor.  Normal color with no rashes, no lesions, and no evidence of cellulitis.     

      Neuro:  Awake and alert, GCS 15, oriented to person, place, time, and situation.            

      Sensory grossly intact.  Cerebellar exam normal. Shuffling gait.                            

10:15 Abdomen/GI: Inspection: abdomen appears normal, Bowel sounds: normal, Palpation:            

      abdomen is soft and non-tender.                                                             

10:15 Back: pain, that is mild, of the  right low back, ROM is painful.                           

10:15 : CVA tenderness, on the right.                                                           

10:15 Musculoskeletal/extremity: ROM: limited active range of motion due to pain, any ROM of      

      the L spine elicits R flank pain, Circulation is intact in all extremities. Sensation       

      intact.                                                                                     

                                                                                                  

Vital Signs:                                                                                      

10:26  / 74; Pulse 75; Resp 16; Temp 98.4; Pulse Ox 97% on R/A; Weight 91.63 kg; Height iw  

      5 ft. 3 in. ; Pain 10/10;                                                                   

13:02  / 88; Pulse 70; Resp 18; Pulse Ox 95% ;                                          ll1 

10:26 Body Mass Index 35.78 (91.63 kg, 160.02 cm)                                             iw  

10:26 Pain Scale: Adult                                                                       iw  

                                                                                                  

MDM:                                                                                              

10:14 Patient medically screened.                                                             [facility] 

12:45 Differential diagnosis: arthritis, strain, UTI, acute kidney injury, renal stone,       [facility] 

      pyelonephritis, degenerative disease. Data reviewed: vital signs, nurses notes, lab         

      test result(s), radiologic studies, plain films. I considered the following discharge       

      prescriptions or medication management in the emergency department Medications were         

      administered in the Emergency Department. See MAR. Independent interpretation of the        

      following test(s) in the Emergency Department X-Ray: My interpretation is no acute          

      findings. Historians other than the Patient: Spouse/Significant Other: . Care        

      significantly affected by the following chronic conditions: Diabetes, Hypertension.         

      Counseling: I had a detailed discussion with the patient and/or guardian regarding: the     

      historical points, exam findings, and any diagnostic results supporting the                 

      discharge/admit diagnosis, to return to the emergency department if symptoms worsen or      

      persist or if there are any questions or concerns that arise at home. Response to           

      treatment: the patient's symptoms have markedly improved after treatment.                   

                                                                                                  

                                                                                             

10:22 Order name: CBC with Diff; Complete Time: 10:52                                         [facility] 

                                                                                             

10:22 Order name: CMP; Complete Time: 11:36                                                   [facility] 

                                                                                             

10:22 Order name: Lipase; Complete Time: 11:36                                                [facility] 

                                                                                             

10:22 Order name: Urinalysis w/ reflexes; Complete Time: 11:36                                [facility] 

                                                                                             

11:39 Order name: XRAY KUB; Complete Time: 12:41                                              [facility] 

                                                                                             

10:22 Order name: IV Saline Lock; Complete Time: 10:31                                        [facility] 

                                                                                             

10:22 Order name: Labs collected and sent; Complete Time: 10:31                               [facility] 

                                                                                                  

Administered Medications:                                                                         

10:44 Drug: TORadol - Ketorolac IVP 15 mg Route: IVP; Site: right forearm;                    1 

13:03 Follow up: Response: No adverse reaction                                                [facility] 

                                                                                                  

                                                                                                  

Disposition Summary:                                                                              

23 12:42                                                                                    

Discharge Ordered                                                                                 

      Location: Home                                                                          [facility] 

      Problem: new                                                                            [facility] 

      Symptoms: have improved                                                                 [facility] 

      Condition: Stable                                                                       [facility] 

      Diagnosis                                                                                   

        - Low back pain                                                                       [facility] 

      Followup:                                                                               [facility] 

        - With: Ting Reza                                                                     

        - When: 2 - 3 days                                                                         

        - Reason: Recheck today's complaints                                                       

      Discharge Instructions:                                                                     

        - Discharge Summary Sheet                                                             [facility] 

        - Acute Back Pain, Adult                                                              [facility] 

        - Musculoskeletal Pain                                                                [facility] 

      Forms:                                                                                      

        - Medication Reconciliation Form                                                      [facility] 

        - Thank You Letter                                                                    [facility] 

      Prescriptions:                                                                              

        - Naprosyn 500 mg Oral Tablet                                                              

            - take 1 tablet by ORAL route 2 times per day take with food; 30 tablet; Refills: [facility] 

      0, Product Selection Permitted                                                              

Signatures:                                                                                       

Dispatcher [employer]                           Renea Ngo RN RN   iw                                                   

Moreno Fernandez RN                       RN   ll1                                                  

Yeimi Smith FNP FNP  [facility]                                                  

                                                                                                  

**************************************************************************************************